# Patient Record
Sex: FEMALE | Race: WHITE | Employment: UNEMPLOYED | URBAN - METROPOLITAN AREA
[De-identification: names, ages, dates, MRNs, and addresses within clinical notes are randomized per-mention and may not be internally consistent; named-entity substitution may affect disease eponyms.]

---

## 2017-12-22 ENCOUNTER — OFFICE VISIT (OUTPATIENT)
Dept: OBGYN CLINIC | Facility: CLINIC | Age: 32
End: 2017-12-22

## 2017-12-22 VITALS
HEART RATE: 72 BPM | BODY MASS INDEX: 22.98 KG/M2 | WEIGHT: 143 LBS | SYSTOLIC BLOOD PRESSURE: 126 MMHG | HEIGHT: 66 IN | RESPIRATION RATE: 16 BRPM | DIASTOLIC BLOOD PRESSURE: 60 MMHG

## 2017-12-22 DIAGNOSIS — Z01.419 ENCOUNTER FOR WELL WOMAN EXAM WITH ROUTINE GYNECOLOGICAL EXAM: Primary | ICD-10-CM

## 2017-12-22 PROCEDURE — 88175 CYTOPATH C/V AUTO FLUID REDO: CPT | Performed by: OBSTETRICS & GYNECOLOGY

## 2017-12-22 PROCEDURE — 99395 PREV VISIT EST AGE 18-39: CPT | Performed by: OBSTETRICS & GYNECOLOGY

## 2017-12-22 PROCEDURE — 87624 HPV HI-RISK TYP POOLED RSLT: CPT | Performed by: OBSTETRICS & GYNECOLOGY

## 2017-12-22 NOTE — PROGRESS NOTES
Deborah Sandifer is a 29year old female [id number] Patient's last menstrual period was 12/05/2017 (exact date). Patient presents with:  Wellness Visit: annual  .Patient has no complaints.  Discussed OCP, nuvaring, Mirena IUID    OBSTETRICS HISTORY:  OB Histor headaches, extremity weakness or numbness. Psychiatric: denies depression or anxiety. Endocrine:   denies excessive thirst or urination. Heme/Lymph:  denies history of anemia, easy bruising or bleeding.       PHYSICAL EXAM:   Constitutional: well develop

## 2019-02-13 ENCOUNTER — OFFICE VISIT (OUTPATIENT)
Dept: OBGYN CLINIC | Facility: CLINIC | Age: 34
End: 2019-02-13
Payer: COMMERCIAL

## 2019-02-13 VITALS
SYSTOLIC BLOOD PRESSURE: 126 MMHG | RESPIRATION RATE: 16 BRPM | HEART RATE: 74 BPM | BODY MASS INDEX: 23.46 KG/M2 | DIASTOLIC BLOOD PRESSURE: 76 MMHG | WEIGHT: 146 LBS | HEIGHT: 66 IN

## 2019-02-13 DIAGNOSIS — Z01.419 ENCOUNTER FOR WELL WOMAN EXAM WITH ROUTINE GYNECOLOGICAL EXAM: Primary | ICD-10-CM

## 2019-02-13 DIAGNOSIS — Z12.4 CERVICAL CANCER SCREENING: ICD-10-CM

## 2019-02-13 PROCEDURE — 88175 CYTOPATH C/V AUTO FLUID REDO: CPT | Performed by: OBSTETRICS & GYNECOLOGY

## 2019-02-13 PROCEDURE — 99395 PREV VISIT EST AGE 18-39: CPT | Performed by: OBSTETRICS & GYNECOLOGY

## 2019-02-13 PROCEDURE — 87624 HPV HI-RISK TYP POOLED RSLT: CPT | Performed by: OBSTETRICS & GYNECOLOGY

## 2019-02-13 RX ORDER — ESCITALOPRAM OXALATE 10 MG/1
10 TABLET ORAL DAILY
Qty: 30 TABLET | Refills: 0 | Status: SHIPPED | OUTPATIENT
Start: 2019-02-13 | End: 2019-02-14

## 2019-02-13 NOTE — PROGRESS NOTES
Rebecca Reed is a 34year old female [id number] Patient's last menstrual period was 02/09/2019 (exact date). Patient presents with:  Wellness Visit  .   Patient is very emotional and crying easily, states she still cannot get over the SAB from couple years Birth control/protection: Condom    Other Topics      Concerns:         Service: Not Asked        Blood Transfusions: Not Asked        Caffeine Concern: No        Occupational Exposure: Not Asked        Hobby Hazards: Not Asked        Sleep Betty bruises  Extremities: no edema, no cyanosis  Psychiatric:  Oriented to time, place, person and situation.  Appropriate mood and affect    Pelvic Exam:  External Genitalia: normal appearance, hair distribution, and no lesions  Urethral Meatus:  normal in siz

## 2019-02-14 LAB — HPV I/H RISK 1 DNA SPEC QL NAA+PROBE: NEGATIVE

## 2019-02-14 RX ORDER — ESCITALOPRAM OXALATE 10 MG/1
10 TABLET ORAL DAILY
Qty: 30 TABLET | Refills: 0 | Status: SHIPPED | OUTPATIENT
Start: 2019-02-14 | End: 2019-03-13

## 2019-02-22 DIAGNOSIS — Z01.419 WELL WOMAN EXAM: Primary | ICD-10-CM

## 2019-02-28 LAB
ABSOLUTE BASOPHILS: 31 CELLS/UL (ref 0–200)
ABSOLUTE EOSINOPHILS: 180 CELLS/UL (ref 15–500)
ABSOLUTE LYMPHOCYTES: 1373 CELLS/UL (ref 850–3900)
ABSOLUTE MONOCYTES: 383 CELLS/UL (ref 200–950)
ABSOLUTE NEUTROPHILS: 2433 CELLS/UL (ref 1500–7800)
ALBUMIN/GLOBULIN RATIO: 1.7 (CALC) (ref 1–2.5)
ALBUMIN: 4.3 G/DL (ref 3.6–5.1)
ALKALINE PHOSPHATASE: 47 U/L (ref 33–115)
ALT: 14 U/L (ref 6–29)
AST: 14 U/L (ref 10–30)
BASOPHILS: 0.7 %
BILIRUBIN, TOTAL: 0.7 MG/DL (ref 0.2–1.2)
BUN: 20 MG/DL (ref 7–25)
CALCIUM: 9.6 MG/DL (ref 8.6–10.2)
CARBON DIOXIDE: 30 MMOL/L (ref 20–32)
CHLORIDE: 104 MMOL/L (ref 98–110)
CHOL/HDLC RATIO: 2.9 (CALC)
CHOLESTEROL, TOTAL: 193 MG/DL
CREATININE: 0.85 MG/DL (ref 0.5–1.1)
EGFR IF AFRICN AM: 106 ML/MIN/1.73M2
EGFR IF NONAFRICN AM: 91 ML/MIN/1.73M2
EOSINOPHILS: 4.1 %
GLOBULIN: 2.6 G/DL (CALC) (ref 1.9–3.7)
GLUCOSE: 84 MG/DL (ref 65–99)
HDL CHOLESTEROL: 66 MG/DL
HEMATOCRIT: 43 % (ref 35–45)
HEMOGLOBIN: 14.5 G/DL (ref 11.7–15.5)
LDL-CHOLESTEROL: 111 MG/DL (CALC)
LYMPHOCYTES: 31.2 %
MCH: 30.5 PG (ref 27–33)
MCHC: 33.7 G/DL (ref 32–36)
MCV: 90.5 FL (ref 80–100)
MONOCYTES: 8.7 %
MPV: 11.1 FL (ref 7.5–12.5)
NEUTROPHILS: 55.3 %
NON-HDL CHOLESTEROL: 127 MG/DL (CALC)
PLATELET COUNT: 181 THOUSAND/UL (ref 140–400)
POTASSIUM: 4.7 MMOL/L (ref 3.5–5.3)
PROTEIN, TOTAL: 6.9 G/DL (ref 6.1–8.1)
RDW: 12.8 % (ref 11–15)
RED BLOOD CELL COUNT: 4.75 MILLION/UL (ref 3.8–5.1)
SODIUM: 139 MMOL/L (ref 135–146)
TRIGLYCERIDES: 72 MG/DL
TSH: 1.73 MIU/L
VITAMIN D, 25-OH, TOTAL: 38 NG/ML (ref 30–100)
WHITE BLOOD CELL COUNT: 4.4 THOUSAND/UL (ref 3.8–10.8)

## 2019-03-12 ENCOUNTER — TELEPHONE (OUTPATIENT)
Dept: OBGYN CLINIC | Facility: CLINIC | Age: 34
End: 2019-03-12

## 2019-03-12 NOTE — TELEPHONE ENCOUNTER
Patient states that she feels like her symptoms have improved on the medication however she is complaining of increased sleepiness and decreased sex drive. She would like to know if she can decrease the dose of the medication.  Will check with Dr. Simon Norris

## 2019-03-12 NOTE — TELEPHONE ENCOUNTER
Per pt she would like to talk to you about the medication she was given a month ago. She thought that she was going to be taking for more than a month but in the system is only for a 1 month.  She also is getting most of the side affects and would like to t

## 2019-03-13 RX ORDER — ESCITALOPRAM OXALATE 5 MG/1
5 TABLET ORAL DAILY
Qty: 30 TABLET | Refills: 11 | Status: SHIPPED | OUTPATIENT
Start: 2019-03-13 | End: 2021-05-17

## 2019-04-10 RX ORDER — ESCITALOPRAM OXALATE 10 MG/1
TABLET ORAL
Qty: 30 TABLET | Refills: 10 | Status: SHIPPED | OUTPATIENT
Start: 2019-04-10 | End: 2019-08-30

## 2019-08-30 ENCOUNTER — OFFICE VISIT (OUTPATIENT)
Dept: OBGYN CLINIC | Facility: CLINIC | Age: 34
End: 2019-08-30
Payer: COMMERCIAL

## 2019-08-30 VITALS
BODY MASS INDEX: 23.14 KG/M2 | SYSTOLIC BLOOD PRESSURE: 124 MMHG | HEART RATE: 92 BPM | HEIGHT: 66 IN | WEIGHT: 144 LBS | DIASTOLIC BLOOD PRESSURE: 58 MMHG

## 2019-08-30 DIAGNOSIS — N76.0 VAGINITIS AND VULVOVAGINITIS: ICD-10-CM

## 2019-08-30 DIAGNOSIS — Z01.419 WELL WOMAN EXAM WITH ROUTINE GYNECOLOGICAL EXAM: Primary | ICD-10-CM

## 2019-08-30 PROCEDURE — 87510 GARDNER VAG DNA DIR PROBE: CPT | Performed by: OBSTETRICS & GYNECOLOGY

## 2019-08-30 PROCEDURE — 87591 N.GONORRHOEAE DNA AMP PROB: CPT | Performed by: OBSTETRICS & GYNECOLOGY

## 2019-08-30 PROCEDURE — 99213 OFFICE O/P EST LOW 20 MIN: CPT | Performed by: OBSTETRICS & GYNECOLOGY

## 2019-08-30 PROCEDURE — 87660 TRICHOMONAS VAGIN DIR PROBE: CPT | Performed by: OBSTETRICS & GYNECOLOGY

## 2019-08-30 PROCEDURE — 87491 CHLMYD TRACH DNA AMP PROBE: CPT | Performed by: OBSTETRICS & GYNECOLOGY

## 2019-08-30 PROCEDURE — 87480 CANDIDA DNA DIR PROBE: CPT | Performed by: OBSTETRICS & GYNECOLOGY

## 2019-08-30 NOTE — PROGRESS NOTES
Her  had a vasectomy refuses to go back and get checked. Complaining of burning at the on his penis. Refuses to go to the doctor. Wishes to have cultures.   She does have a thin white discharge affirm was done cervix without lesion Gen-Probe done

## 2019-09-01 LAB
C TRACH DNA SPEC QL NAA+PROBE: NEGATIVE
N GONORRHOEA DNA SPEC QL NAA+PROBE: NEGATIVE

## 2019-09-05 ENCOUNTER — TELEPHONE (OUTPATIENT)
Dept: OBGYN CLINIC | Facility: CLINIC | Age: 34
End: 2019-09-05

## 2019-09-05 RX ORDER — METRONIDAZOLE 500 MG/1
500 TABLET ORAL 2 TIMES DAILY
Qty: 14 TABLET | Refills: 0 | Status: SHIPPED | OUTPATIENT
Start: 2019-09-05 | End: 2019-09-12

## 2019-09-05 NOTE — PROGRESS NOTES
Patient aware of results and recommendations and would like a script for Flagyl. Script placed. . Patient verbalizes understanding.

## 2021-05-17 ENCOUNTER — OFFICE VISIT (OUTPATIENT)
Dept: OBGYN CLINIC | Facility: CLINIC | Age: 36
End: 2021-05-17
Payer: COMMERCIAL

## 2021-05-17 VITALS
DIASTOLIC BLOOD PRESSURE: 70 MMHG | BODY MASS INDEX: 22.98 KG/M2 | HEIGHT: 66 IN | SYSTOLIC BLOOD PRESSURE: 110 MMHG | WEIGHT: 143 LBS | HEART RATE: 80 BPM

## 2021-05-17 DIAGNOSIS — Z12.4 CERVICAL CANCER SCREENING: ICD-10-CM

## 2021-05-17 DIAGNOSIS — Z01.419 ENCOUNTER FOR WELL WOMAN EXAM WITH ROUTINE GYNECOLOGICAL EXAM: Primary | ICD-10-CM

## 2021-05-17 PROCEDURE — 3008F BODY MASS INDEX DOCD: CPT | Performed by: OBSTETRICS & GYNECOLOGY

## 2021-05-17 PROCEDURE — 3074F SYST BP LT 130 MM HG: CPT | Performed by: OBSTETRICS & GYNECOLOGY

## 2021-05-17 PROCEDURE — 99395 PREV VISIT EST AGE 18-39: CPT | Performed by: OBSTETRICS & GYNECOLOGY

## 2021-05-17 PROCEDURE — 88175 CYTOPATH C/V AUTO FLUID REDO: CPT | Performed by: OBSTETRICS & GYNECOLOGY

## 2021-05-17 PROCEDURE — 87624 HPV HI-RISK TYP POOLED RSLT: CPT | Performed by: OBSTETRICS & GYNECOLOGY

## 2021-05-17 PROCEDURE — 3078F DIAST BP <80 MM HG: CPT | Performed by: OBSTETRICS & GYNECOLOGY

## 2021-05-17 NOTE — PROGRESS NOTES
Mike Peña is a 37year old female [id number] Patient's last menstrual period was 05/06/2021 (exact date). Patient presents with:  Wellness Visit  .   Patient has no complaints, would like to have blood work done  OBSTETRICS HISTORY:  OB History   Gravid Helmet: Not Asked        Seat Belt: Yes        Self-Exams: Not Asked    Social History Narrative      Not on file    Social Determinants of Health  Financial Resource Strain:       Difficulty of Paying Living Expenses:   Food Insecurity:       Worried Camelia Bruce itching  Musculoskeletal:  denies back pain. Skin/Breast:  Denies any breast pain, lumps, or discharge. Neurological:  denies headaches, extremity weakness or numbness. Psychiatric: denies depression or anxiety.   Endocrine:   denies excessive thirst or

## 2021-05-22 ENCOUNTER — LAB ENCOUNTER (OUTPATIENT)
Dept: LAB | Age: 36
End: 2021-05-22
Attending: FAMILY MEDICINE
Payer: COMMERCIAL

## 2021-05-22 DIAGNOSIS — Z01.419 ENCOUNTER FOR WELL WOMAN EXAM WITH ROUTINE GYNECOLOGICAL EXAM: ICD-10-CM

## 2021-05-22 PROCEDURE — 84443 ASSAY THYROID STIM HORMONE: CPT

## 2021-05-22 PROCEDURE — 80053 COMPREHEN METABOLIC PANEL: CPT

## 2021-05-22 PROCEDURE — 80061 LIPID PANEL: CPT

## 2021-05-22 PROCEDURE — 36415 COLL VENOUS BLD VENIPUNCTURE: CPT

## 2021-05-22 PROCEDURE — 85025 COMPLETE CBC W/AUTO DIFF WBC: CPT

## 2022-06-10 ENCOUNTER — HOSPITAL ENCOUNTER (OUTPATIENT)
Age: 37
Discharge: HOME | End: 2022-06-10
Attending: EMERGENCY MEDICINE
Payer: COMMERCIAL

## 2022-06-10 VITALS
RESPIRATION RATE: 16 BRPM | DIASTOLIC BLOOD PRESSURE: 82 MMHG | SYSTOLIC BLOOD PRESSURE: 128 MMHG | HEIGHT: 66 IN | BODY MASS INDEX: 22.5 KG/M2 | TEMPERATURE: 97 F | OXYGEN SATURATION: 98 % | WEIGHT: 140 LBS | HEART RATE: 68 BPM

## 2022-06-10 DIAGNOSIS — L03.011 CELLULITIS OF FINGER OF RIGHT HAND: Primary | ICD-10-CM

## 2022-06-10 PROCEDURE — 99213 OFFICE O/P EST LOW 20 MIN: CPT

## 2022-06-10 PROCEDURE — 99203 OFFICE O/P NEW LOW 30 MIN: CPT

## 2022-06-10 RX ORDER — AMOXICILLIN AND CLAVULANATE POTASSIUM 875; 125 MG/1; MG/1
1 TABLET, FILM COATED ORAL 2 TIMES DAILY
Qty: 20 TABLET | Refills: 0 | Status: SHIPPED | OUTPATIENT
Start: 2022-06-10 | End: 2022-06-20

## 2022-06-10 RX ORDER — AMOXICILLIN AND CLAVULANATE POTASSIUM 875; 125 MG/1; MG/1
1 TABLET, FILM COATED ORAL 2 TIMES DAILY
Qty: 20 TABLET | Refills: 0 | Status: SHIPPED | OUTPATIENT
Start: 2022-06-10 | End: 2022-06-10

## 2022-06-15 NOTE — ED INITIAL ASSESSMENT (HPI)
Pt with splinter under R 5th fingernail since Wed; swelling/redness also since wed; no fever    Seen at outside IC x 2 and unable to remove; pt was put on bactrim today Quality 431: Preventive Care And Screening: Unhealthy Alcohol Use - Screening: Patient not identified as an unhealthy alcohol user when screened for unhealthy alcohol use using a systematic screening method Quality 110: Preventive Care And Screening: Influenza Immunization: Influenza Immunization previously received during influenza season Detail Level: Detailed Quality 226: Preventive Care And Screening: Tobacco Use: Screening And Cessation Intervention: Patient screened for tobacco use and is an ex/non-smoker Quality 130: Documentation Of Current Medications In The Medical Record: Current Medications Documented

## 2023-10-02 ENCOUNTER — OFFICE VISIT (OUTPATIENT)
Dept: FAMILY MEDICINE CLINIC | Facility: CLINIC | Age: 38
End: 2023-10-02
Payer: COMMERCIAL

## 2023-10-02 VITALS
SYSTOLIC BLOOD PRESSURE: 100 MMHG | DIASTOLIC BLOOD PRESSURE: 60 MMHG | TEMPERATURE: 98 F | WEIGHT: 148 LBS | BODY MASS INDEX: 23.78 KG/M2 | RESPIRATION RATE: 16 BRPM | HEART RATE: 66 BPM | HEIGHT: 66.14 IN

## 2023-10-02 DIAGNOSIS — L81.9 HYPERPIGMENTATION OF SKIN: ICD-10-CM

## 2023-10-02 DIAGNOSIS — R20.2 NUMBNESS AND TINGLING OF LEFT UPPER AND LOWER EXTREMITY: ICD-10-CM

## 2023-10-02 DIAGNOSIS — L60.8 TOENAIL DEFORMITY: ICD-10-CM

## 2023-10-02 DIAGNOSIS — R20.0 NUMBNESS AND TINGLING OF LEFT UPPER AND LOWER EXTREMITY: ICD-10-CM

## 2023-10-02 DIAGNOSIS — R43.8 METALLIC TASTE: Primary | ICD-10-CM

## 2023-10-02 DIAGNOSIS — E78.2 MIXED HYPERLIPIDEMIA: ICD-10-CM

## 2023-10-02 DIAGNOSIS — Z00.00 LABORATORY EXAM ORDERED AS PART OF ROUTINE GENERAL MEDICAL EXAMINATION: ICD-10-CM

## 2023-10-02 PROCEDURE — 3078F DIAST BP <80 MM HG: CPT | Performed by: STUDENT IN AN ORGANIZED HEALTH CARE EDUCATION/TRAINING PROGRAM

## 2023-10-02 PROCEDURE — 99204 OFFICE O/P NEW MOD 45 MIN: CPT | Performed by: STUDENT IN AN ORGANIZED HEALTH CARE EDUCATION/TRAINING PROGRAM

## 2023-10-02 PROCEDURE — 3008F BODY MASS INDEX DOCD: CPT | Performed by: STUDENT IN AN ORGANIZED HEALTH CARE EDUCATION/TRAINING PROGRAM

## 2023-10-02 PROCEDURE — 3074F SYST BP LT 130 MM HG: CPT | Performed by: STUDENT IN AN ORGANIZED HEALTH CARE EDUCATION/TRAINING PROGRAM

## 2023-10-04 ENCOUNTER — LAB ENCOUNTER (OUTPATIENT)
Dept: LAB | Age: 38
End: 2023-10-04
Attending: STUDENT IN AN ORGANIZED HEALTH CARE EDUCATION/TRAINING PROGRAM
Payer: COMMERCIAL

## 2023-10-04 DIAGNOSIS — E78.2 MIXED HYPERLIPIDEMIA: ICD-10-CM

## 2023-10-04 DIAGNOSIS — Z00.00 LABORATORY EXAM ORDERED AS PART OF ROUTINE GENERAL MEDICAL EXAMINATION: ICD-10-CM

## 2023-10-04 DIAGNOSIS — R43.8 METALLIC TASTE: ICD-10-CM

## 2023-10-04 LAB
ALBUMIN SERPL-MCNC: 3.8 G/DL (ref 3.4–5)
ALBUMIN/GLOB SERPL: 1.2 {RATIO} (ref 1–2)
ALP LIVER SERPL-CCNC: 53 U/L
ALT SERPL-CCNC: 20 U/L
ANION GAP SERPL CALC-SCNC: 3 MMOL/L (ref 0–18)
AST SERPL-CCNC: 12 U/L (ref 15–37)
BASOPHILS # BLD AUTO: 0.02 X10(3) UL (ref 0–0.2)
BASOPHILS NFR BLD AUTO: 0.4 %
BILIRUB SERPL-MCNC: 0.6 MG/DL (ref 0.1–2)
BILIRUB UR QL STRIP.AUTO: NEGATIVE
BUN BLD-MCNC: 20 MG/DL (ref 7–18)
CALCIUM BLD-MCNC: 8.9 MG/DL (ref 8.5–10.1)
CHLORIDE SERPL-SCNC: 106 MMOL/L (ref 98–112)
CHOLEST SERPL-MCNC: 191 MG/DL (ref ?–200)
CLARITY UR REFRACT.AUTO: CLEAR
CO2 SERPL-SCNC: 27 MMOL/L (ref 21–32)
CREAT BLD-MCNC: 0.73 MG/DL
EGFRCR SERPLBLD CKD-EPI 2021: 110 ML/MIN/1.73M2 (ref 60–?)
EOSINOPHIL # BLD AUTO: 0.18 X10(3) UL (ref 0–0.7)
EOSINOPHIL NFR BLD AUTO: 3.6 %
ERYTHROCYTE [DISTWIDTH] IN BLOOD BY AUTOMATED COUNT: 12.9 %
FASTING PATIENT LIPID ANSWER: YES
FASTING STATUS PATIENT QL REPORTED: YES
FOLATE SERPL-MCNC: 14.2 NG/ML (ref 8.7–?)
GLOBULIN PLAS-MCNC: 3.2 G/DL (ref 2.8–4.4)
GLUCOSE BLD-MCNC: 79 MG/DL (ref 70–99)
GLUCOSE UR STRIP.AUTO-MCNC: NORMAL MG/DL
HCT VFR BLD AUTO: 43.7 %
HDLC SERPL-MCNC: 60 MG/DL (ref 40–59)
HGB BLD-MCNC: 14.1 G/DL
IMM GRANULOCYTES # BLD AUTO: 0.01 X10(3) UL (ref 0–1)
IMM GRANULOCYTES NFR BLD: 0.2 %
KETONES UR STRIP.AUTO-MCNC: NEGATIVE MG/DL
LDLC SERPL CALC-MCNC: 123 MG/DL (ref ?–100)
LEUKOCYTE ESTERASE UR QL STRIP.AUTO: NEGATIVE
LYMPHOCYTES # BLD AUTO: 1.09 X10(3) UL (ref 1–4)
LYMPHOCYTES NFR BLD AUTO: 21.5 %
MCH RBC QN AUTO: 29.2 PG (ref 26–34)
MCHC RBC AUTO-ENTMCNC: 32.3 G/DL (ref 31–37)
MCV RBC AUTO: 90.5 FL
MONOCYTES # BLD AUTO: 0.39 X10(3) UL (ref 0.1–1)
MONOCYTES NFR BLD AUTO: 7.7 %
NEUTROPHILS # BLD AUTO: 3.37 X10 (3) UL (ref 1.5–7.7)
NEUTROPHILS # BLD AUTO: 3.37 X10(3) UL (ref 1.5–7.7)
NEUTROPHILS NFR BLD AUTO: 66.6 %
NITRITE UR QL STRIP.AUTO: NEGATIVE
NONHDLC SERPL-MCNC: 131 MG/DL (ref ?–130)
OSMOLALITY SERPL CALC.SUM OF ELEC: 284 MOSM/KG (ref 275–295)
PH UR STRIP.AUTO: 7 [PH] (ref 5–8)
PLATELET # BLD AUTO: 164 10(3)UL (ref 150–450)
POTASSIUM SERPL-SCNC: 4.4 MMOL/L (ref 3.5–5.1)
PROT SERPL-MCNC: 7 G/DL (ref 6.4–8.2)
PROT UR STRIP.AUTO-MCNC: NEGATIVE MG/DL
RBC # BLD AUTO: 4.83 X10(6)UL
RBC #/AREA URNS AUTO: >10 /HPF
SODIUM SERPL-SCNC: 136 MMOL/L (ref 136–145)
SP GR UR STRIP.AUTO: 1.02 (ref 1–1.03)
T4 FREE SERPL-MCNC: 1 NG/DL (ref 0.8–1.7)
TRIGL SERPL-MCNC: 42 MG/DL (ref 30–149)
TSI SER-ACNC: 1.14 MIU/ML (ref 0.36–3.74)
UROBILINOGEN UR STRIP.AUTO-MCNC: NORMAL MG/DL
VIT B12 SERPL-MCNC: 667 PG/ML (ref 193–986)
VLDLC SERPL CALC-MCNC: 7 MG/DL (ref 0–30)
WBC # BLD AUTO: 5.1 X10(3) UL (ref 4–11)

## 2023-10-04 PROCEDURE — 85025 COMPLETE CBC W/AUTO DIFF WBC: CPT

## 2023-10-04 PROCEDURE — 84443 ASSAY THYROID STIM HORMONE: CPT

## 2023-10-04 PROCEDURE — 80061 LIPID PANEL: CPT

## 2023-10-04 PROCEDURE — 82746 ASSAY OF FOLIC ACID SERUM: CPT

## 2023-10-04 PROCEDURE — 80053 COMPREHEN METABOLIC PANEL: CPT

## 2023-10-04 PROCEDURE — 82607 VITAMIN B-12: CPT

## 2023-10-04 PROCEDURE — 84439 ASSAY OF FREE THYROXINE: CPT

## 2023-10-04 PROCEDURE — 81001 URINALYSIS AUTO W/SCOPE: CPT

## 2023-10-06 ENCOUNTER — OFFICE VISIT (OUTPATIENT)
Dept: OTOLARYNGOLOGY | Facility: CLINIC | Age: 38
End: 2023-10-06

## 2023-10-06 DIAGNOSIS — J34.89 NASAL OBSTRUCTION: ICD-10-CM

## 2023-10-06 DIAGNOSIS — R09.81 NASAL CONGESTION: ICD-10-CM

## 2023-10-06 DIAGNOSIS — J34.3 HYPERTROPHY OF BOTH INFERIOR NASAL TURBINATES: ICD-10-CM

## 2023-10-06 DIAGNOSIS — J34.2 NASAL SEPTAL DEVIATION: Primary | ICD-10-CM

## 2023-10-06 RX ORDER — AZELASTINE 1 MG/ML
2 SPRAY, METERED NASAL 2 TIMES DAILY
Qty: 30 ML | Refills: 0 | Status: SHIPPED | OUTPATIENT
Start: 2023-10-06

## 2023-10-06 RX ORDER — LEVOCETIRIZINE DIHYDROCHLORIDE 5 MG/1
5 TABLET, FILM COATED ORAL EVERY EVENING
Qty: 21 TABLET | Refills: 0 | Status: SHIPPED | OUTPATIENT
Start: 2023-10-06 | End: 2023-10-27

## 2023-10-06 NOTE — PROGRESS NOTES
Uriel Mack is a 30year old female. Patient presents with:  Nose Problem: Pt c/o left nostril congested. X 2 years. ASSESSMENT AND PLAN:   1. Nasal septal deviation      2. Nasal obstruction  46-year-old presents with chronic nasal obstruction. Feels that the left side of her nose is chronically blocked. It has been like this most of her life. Affects her sleep and her snoring and quality of life. She has tried a medication nasal spray from [employer] she is unsure of the name. It did not help all too much. She denies significant allergies. She denies any sinus infections. She also reports chronic occipital headaches. Exam she has 2 septal spurs that are prominent on the left one is higher up of the mid septum and the other is almost impinging into the middle meatus. On the right it also appears that she has a spur of the mid septum near the head of the middle turbinate. Her turbinates are hypertrophied on both sides. Discussed that she does have exam findings that could be leading to her nasal obstruction. Predominantly the septal spurs on the left side. Somewhat posteriorly and superiorly. We will get a CT scan to better characterize given the somewhat difficult location of the spurs. Also trial on Flonase topical nasal spray and oral antihistamine prior to sleep. We will bring her back after the scan and to review response to the medications. Discussed septoplasty as acute reduction in detail with her. She is going to consider the surgery and may further arrange this if she does not respond to medications. MDM  -2+ chronic issues  -Decision regarding a procedure    - CT SINUS [facility] (CPT=70486); Future    3. Hypertrophy of both inferior nasal turbinates      4. Nasal congestion        The patient indicates understanding of these issues and agrees to the plan.       EXAM:   LMP 09/07/2023 (Exact Date)     Pertinent exam findings may also be noted above in assessment and plan System Details   Skin Inspection - Normal.   Constitutional Overall appearance - Normal.   Head/Face Symmetric, TMJ tenderness not present    Eyes EOMI, PERRL   Right ear:  Canal clear, TM intact, no COLLEEN   Left ear:  Canal clear, TM intact, no COLLEEN   Nose: Exam she has 2 septal spurs that are prominent on the left one is higher up of the mid septum and the other is almost impinging into the middle meatus. On the right it also appears that she has a spur of the mid septum near the head of the middle turbinate. Her turbinates are hypertrophied on both sides. , nasal valves without collapse    Oral cavity/Oropharynx: No lesions or masses on inspection or palpation, tonsils symmetric    Neck: Soft without LAD, thyroid not enlarged  Voice clear/ no stridor   Other:      Scopes and Procedures:     Nasal Endoscopy Procedure Note     Due to inability for adequate examination of the nose and nasopharynx and need for magnification to perform the examination, endoscopy was performed. Risks and benefits were discussed with patient/family and they have given verbal consent to proceed. Pre-operative Diagnosis:   Nasal septal deviation  (primary encounter diagnosis)  Nasal obstruction  Hypertrophy of both inferior nasal turbinates  Nasal congestion    Post-operative Diagnosis: Same    Procedure: Diagnostic nasal endoscopy    Anesthesia: Topical anesthetic Seymour     Surgeon Marilin Leon MD    EBL: 0cc    Procedure Detail & Findings:     After placement of topical anesthetic intranasally the endoscope was inserted into each nares and driven through the nasal cavity into the nasopharynx. The following findings were noted:    Exam she has 2 septal spurs that are prominent on the left one is higher up of the mid septum and the other is almost impinging into the middle meatus. On the right it also appears that she has a spur of the mid septum near the head of the middle turbinate.   Her turbinates are hypertrophied on both sides.  Middle meatus: Patent  Middle turbinates: Normal  Purulence: None noted  Polyps: None noted  Nasopharynx and eustachian tube: No masses  Other: The middle and superior meatus, the turbinates, and the spheno-ethmoid recess were inspected and seen to be without significant abnormal findings. Condition: Stable    Complications: Patient tolerated the procedure well with no immediate complication. Geronimo Greenberg MD        Current Outpatient Medications   Medication Sig Dispense Refill    azelastine 0.1 % Nasal Solution 2 sprays by Nasal route 2 (two) times daily. 30 mL 0    levocetirizine 5 MG Oral Tab Take 1 tablet (5 mg total) by mouth every evening for 21 days.  21 tablet 0      Past Medical History:   Diagnosis Date    Pap smear for cervical cancer screening 10/14,04/12    negative      Social History:  Social History     Socioeconomic History    Marital status:    Tobacco Use    Smoking status: Never    Smokeless tobacco: Never    Tobacco comments:     Socially   Vaping Use    Vaping Use: Never used   Substance and Sexual Activity    Alcohol use: Yes     Comment: occ    Drug use: No    Sexual activity: Yes     Birth control/protection: Condom   Other Topics Concern    Caffeine Concern Yes     Comment: 1 x/week    Exercise Yes     Comment: 20-30 min/daily    Seat Belt Yes          Reynaldo Calderon MD  10/6/2023  3:35 PM

## 2023-10-11 ENCOUNTER — LAB ENCOUNTER (OUTPATIENT)
Dept: LAB | Age: 38
End: 2023-10-11
Attending: STUDENT IN AN ORGANIZED HEALTH CARE EDUCATION/TRAINING PROGRAM
Payer: COMMERCIAL

## 2023-10-11 DIAGNOSIS — R43.8 METALLIC TASTE: ICD-10-CM

## 2023-10-11 PROCEDURE — 87338 HPYLORI STOOL AG IA: CPT

## 2023-10-13 ENCOUNTER — HOSPITAL ENCOUNTER (OUTPATIENT)
Dept: GENERAL RADIOLOGY | Age: 38
Discharge: HOME | End: 2023-10-13
Attending: STUDENT IN AN ORGANIZED HEALTH CARE EDUCATION/TRAINING PROGRAM
Payer: COMMERCIAL

## 2023-10-13 DIAGNOSIS — R20.0 NUMBNESS AND TINGLING OF LEFT UPPER AND LOWER EXTREMITY: ICD-10-CM

## 2023-10-13 DIAGNOSIS — R20.2 NUMBNESS AND TINGLING OF LEFT UPPER AND LOWER EXTREMITY: ICD-10-CM

## 2023-10-13 DIAGNOSIS — M41.9 MILD SCOLIOSIS: ICD-10-CM

## 2023-10-13 DIAGNOSIS — R20.0 NUMBNESS AND TINGLING OF LEFT UPPER AND LOWER EXTREMITY: Primary | ICD-10-CM

## 2023-10-13 DIAGNOSIS — R20.2 NUMBNESS AND TINGLING OF LEFT UPPER AND LOWER EXTREMITY: Primary | ICD-10-CM

## 2023-10-13 PROCEDURE — 72110 X-RAY EXAM L-2 SPINE 4/>VWS: CPT | Performed by: STUDENT IN AN ORGANIZED HEALTH CARE EDUCATION/TRAINING PROGRAM

## 2023-10-13 PROCEDURE — 72050 X-RAY EXAM NECK SPINE 4/5VWS: CPT | Performed by: STUDENT IN AN ORGANIZED HEALTH CARE EDUCATION/TRAINING PROGRAM

## 2023-10-15 DIAGNOSIS — R20.0 NUMBNESS AND TINGLING OF LEFT UPPER AND LOWER EXTREMITY: Primary | ICD-10-CM

## 2023-10-15 DIAGNOSIS — R20.2 NUMBNESS AND TINGLING OF LEFT UPPER AND LOWER EXTREMITY: Primary | ICD-10-CM

## 2023-10-16 DIAGNOSIS — R43.8 METALLIC TASTE: Primary | ICD-10-CM

## 2023-10-16 LAB — H PYLORI AG STL QL IA: NEGATIVE

## 2023-10-16 RX ORDER — OMEPRAZOLE 40 MG/1
40 CAPSULE, DELAYED RELEASE ORAL DAILY
Qty: 90 CAPSULE | Refills: 0 | Status: SHIPPED | OUTPATIENT
Start: 2023-10-16

## 2023-10-30 ENCOUNTER — OFFICE VISIT (OUTPATIENT)
Dept: OTOLARYNGOLOGY | Facility: CLINIC | Age: 38
End: 2023-10-30

## 2023-10-30 DIAGNOSIS — J30.9 CHRONIC ALLERGIC RHINITIS: Primary | ICD-10-CM

## 2023-10-30 DIAGNOSIS — R09.81 NASAL CONGESTION: ICD-10-CM

## 2023-10-30 PROCEDURE — 99213 OFFICE O/P EST LOW 20 MIN: CPT | Performed by: STUDENT IN AN ORGANIZED HEALTH CARE EDUCATION/TRAINING PROGRAM

## 2023-10-30 RX ORDER — AZELASTINE 1 MG/ML
2 SPRAY, METERED NASAL 2 TIMES DAILY
Qty: 30 ML | Refills: 5 | Status: SHIPPED | OUTPATIENT
Start: 2023-10-30

## 2023-10-31 NOTE — PROGRESS NOTES
Eda Ray is a 41year old female. Patient presents with: Follow - Up: Patient here for f/up for nasal issues, deviated septum. Patient reports she is feeling better. ASSESSMENT AND PLAN:   1. Chronic allergic rhinitis  62-year-old in follow-up regarding her chronic nasal obstruction. On exam she had a septal deviation and she was started on azelastine and oral antihistamine prior to sleep. She states that she is doing excellent and would like refills of the Astelin. We discussed in detail the use of the nasal spray. She has not yet ready to pursue a procedure. She may consider this in the future. She can return if any future issues arise or she may want a more permanent correction of her posterior septal deviation. 2. Nasal congestion        The patient indicates understanding of these issues and agrees to the plan. EXAM:   LMP 09/07/2023 (Exact Date)     Pertinent exam findings may also be noted above in assessment and plan     System Details   Skin Inspection - Normal.   Constitutional Overall appearance - Normal.   Head/Face Symmetric, TMJ tenderness not present    Eyes EOMI, PERRL   Right ear:  Canal clear, TM intact, no COLLEEN   Left ear:  Canal clear, TM intact, no COLLEEN   Nose: Septum midline, inferior turbinates not enlarged, nasal valves without collapse    Oral cavity/Oropharynx: No lesions or masses on inspection or palpation, tonsils symmetric    Neck: Soft without LAD, thyroid not enlarged  Voice clear/ no stridor   Other:      Scopes and Procedures:             Current Outpatient Medications   Medication Sig Dispense Refill    azelastine 0.1 % Nasal Solution 2 sprays by Nasal route 2 (two) times daily. 30 mL 5    Omeprazole 40 MG Oral Capsule Delayed Release Take 1 capsule (40 mg total) by mouth daily.  90 capsule 0      Past Medical History:   Diagnosis Date    Pap smear for cervical cancer screening 10/14,04/12    negative      Social History:  Social History     Socioeconomic History    Marital status:    Tobacco Use    Smoking status: Never    Smokeless tobacco: Never    Tobacco comments:     Socially   Vaping Use    Vaping Use: Never used   Substance and Sexual Activity    Alcohol use: Yes     Comment: occ    Drug use: No    Sexual activity: Yes     Birth control/protection: Condom   Other Topics Concern    Caffeine Concern Yes     Comment: 1 x/week    Exercise Yes     Comment: 20-30 min/daily    Seat Belt Yes          Bruna Lloyd MD  10/31/2023  8:09 AM

## 2023-11-03 ENCOUNTER — HOSPITAL ENCOUNTER (OUTPATIENT)
Dept: GENERAL RADIOLOGY | Age: 38
Discharge: HOME | End: 2023-11-03
Attending: STUDENT IN AN ORGANIZED HEALTH CARE EDUCATION/TRAINING PROGRAM
Payer: COMMERCIAL

## 2023-11-03 DIAGNOSIS — R20.2 NUMBNESS AND TINGLING OF LEFT UPPER AND LOWER EXTREMITY: ICD-10-CM

## 2023-11-03 DIAGNOSIS — M25.562 CHRONIC PAIN OF LEFT KNEE: ICD-10-CM

## 2023-11-03 DIAGNOSIS — R20.0 NUMBNESS AND TINGLING OF LEFT UPPER AND LOWER EXTREMITY: ICD-10-CM

## 2023-11-03 DIAGNOSIS — Z01.89 ENCOUNTER FOR LOWER EXTREMITY COMPARISON IMAGING STUDY: ICD-10-CM

## 2023-11-03 DIAGNOSIS — G89.29 CHRONIC PAIN OF LEFT KNEE: ICD-10-CM

## 2023-11-03 DIAGNOSIS — M41.9 MILD SCOLIOSIS: ICD-10-CM

## 2023-11-03 PROCEDURE — 72082 X-RAY EXAM ENTIRE SPI 2/3 VW: CPT | Performed by: STUDENT IN AN ORGANIZED HEALTH CARE EDUCATION/TRAINING PROGRAM

## 2023-11-03 PROCEDURE — 73562 X-RAY EXAM OF KNEE 3: CPT | Performed by: STUDENT IN AN ORGANIZED HEALTH CARE EDUCATION/TRAINING PROGRAM

## 2023-11-03 PROCEDURE — 73564 X-RAY EXAM KNEE 4 OR MORE: CPT | Performed by: STUDENT IN AN ORGANIZED HEALTH CARE EDUCATION/TRAINING PROGRAM

## 2023-11-04 DIAGNOSIS — M25.562 CHRONIC PAIN OF LEFT KNEE: Primary | ICD-10-CM

## 2023-11-04 DIAGNOSIS — R20.2 NUMBNESS AND TINGLING OF LEFT UPPER AND LOWER EXTREMITY: Primary | ICD-10-CM

## 2023-11-04 DIAGNOSIS — R20.0 NUMBNESS AND TINGLING OF LEFT UPPER AND LOWER EXTREMITY: Primary | ICD-10-CM

## 2023-11-04 DIAGNOSIS — G89.29 CHRONIC PAIN OF LEFT KNEE: Primary | ICD-10-CM

## 2023-11-04 DIAGNOSIS — M41.9 SCOLIOSIS OF THORACOLUMBAR SPINE, UNSPECIFIED SCOLIOSIS TYPE: ICD-10-CM

## 2023-11-04 DIAGNOSIS — M41.9 MILD SCOLIOSIS: ICD-10-CM

## 2023-11-12 ENCOUNTER — PATIENT MESSAGE (OUTPATIENT)
Dept: FAMILY MEDICINE CLINIC | Facility: CLINIC | Age: 38
End: 2023-11-12

## 2023-11-13 NOTE — TELEPHONE ENCOUNTER
From: Zelalem Donis  To: Jonh Dc  Sent: 11/12/2023 9:04 AM CST  Subject: Sciatica    Morning Dr. Bev [place]    I started having shooting pain in my right hip & leg. Feel like my sciatica is coming back. Should I come & see you or start doing physical therapy? If yes, where do I schedule that appt?      Sincerely,    Rut Mead

## 2023-11-14 NOTE — TELEPHONE ENCOUNTER
I recommend starting physical therapy, if worsening then we should re-evaluate. If any weakness, loss of bowel or bladder control to call or go to the ER. Thank you.

## 2023-12-04 ENCOUNTER — OFFICE VISIT (OUTPATIENT)
Dept: ORTHOPEDICS CLINIC | Facility: CLINIC | Age: 38
End: 2023-12-04
Payer: COMMERCIAL

## 2023-12-04 VITALS — WEIGHT: 147 LBS | BODY MASS INDEX: 23.63 KG/M2 | HEIGHT: 66 IN

## 2023-12-04 DIAGNOSIS — M25.652 HIP STIFFNESS, LEFT: ICD-10-CM

## 2023-12-04 DIAGNOSIS — R29.898 POPPING OF LEFT KNEE JOINT: ICD-10-CM

## 2023-12-04 DIAGNOSIS — M54.16 LUMBAR RADICULOPATHY: Primary | ICD-10-CM

## 2023-12-04 PROCEDURE — 3008F BODY MASS INDEX DOCD: CPT | Performed by: ORTHOPAEDIC SURGERY

## 2023-12-04 PROCEDURE — 99204 OFFICE O/P NEW MOD 45 MIN: CPT | Performed by: ORTHOPAEDIC SURGERY

## 2024-01-08 ENCOUNTER — TELEPHONE (OUTPATIENT)
Dept: PHYSICAL THERAPY | Facility: HOSPITAL | Age: 39
End: 2024-01-08

## 2024-01-09 ENCOUNTER — OFFICE VISIT (OUTPATIENT)
Dept: PHYSICAL THERAPY | Age: 39
End: 2024-01-09
Attending: ORTHOPAEDIC SURGERY
Payer: COMMERCIAL

## 2024-01-09 DIAGNOSIS — M25.652 HIP STIFFNESS, LEFT: ICD-10-CM

## 2024-01-09 DIAGNOSIS — R29.898 POPPING OF LEFT KNEE JOINT: ICD-10-CM

## 2024-01-09 DIAGNOSIS — M54.16 LUMBAR RADICULOPATHY: Primary | ICD-10-CM

## 2024-01-09 PROCEDURE — 97161 PT EVAL LOW COMPLEX 20 MIN: CPT | Performed by: PHYSICAL THERAPIST

## 2024-01-09 PROCEDURE — 97110 THERAPEUTIC EXERCISES: CPT | Performed by: PHYSICAL THERAPIST

## 2024-01-09 NOTE — PROGRESS NOTES
SPINE EVALUATION:     Diagnosis:   Lumbar radiculopathy (M54.16)    Popping of left knee joint (R29.898)    Hip stiffness, left (M25.652)       (L) Thoracic outlet   Referring Provider: Louie  Date of Evaluation:    1/9/2024    Precautions:  None Next MD visit:   none scheduled  Date of Surgery: n/a     PATIENT SUMMARY   Leydi Lorenz is a 38 year old female who presents to therapy today with complaints of lumbar pain with tingling to the (L) LE and also (L) UE tingling. Her tingling and pain typically occurs with sleeping and in supine > in side lying. She dates her initial low back problem back to when she had kids but it was not tingling to the LE's. Pt states she used to lift heavier weights about 3 years ago but did not have back problem at the time. Her sciatica started about one year ago since she stopped working out. She feels the low back pain and leg pain in resting positions after a long day. No trouble with activities during the day. Her tingling refers to the (L) shin. Her knees hurts with kneeling and also pops in deep squatting positions.     Pt describes pain level current 2/10, at best 0/10, at worst 5/10.   Current functional limitations include laying and sleeping positions, kneeling, and deep squatting.     Leydi describes prior level of function ability to work out at the gym consistently and lifting \"heavier weight\". Pt goals include have no LBP and tingling when laying.    Past medical history was reviewed with Leydi. Significant findings include  has a past medical history of Pap smear for cervical cancer screening (10/14,04/12).        Pt denies diplopia, dysarthria, dysphasia, dizziness, drop attacks, bowel/bladder changes, saddle anesthesia, and IRIS LE N/T.    Spine X-Ray on 11/3/23 \" CONCLUSION:    Mild scoliosis about 3.5 mm concave left spanning the thoracolumbar junction.  Scoliosis concave to the right in the lumbar spine about 2ø.  No spinal segmentation anomaly.  No signs  of subluxation, fracture or destructive process.\"        ASSESSMENT  Leydi presents to physical therapy evaluation with primary c/o lumbar pain with tingling to the (L) LE and tingling to the (L) UE when laying down. The results of the objective tests and measures show impairments in core stability, soft tissue restrictions, LE strength, UE strength, asymmetrical pelvic alignment, neural tightness, posture and body mechanics.  Functional deficits include but are not limited to laying and sleeping positions, kneeling, and deep squatting.  Signs and symptoms are consistent with diagnosis of (L) lumbar radiculopathy and (L) thoracic outlet syndrome. Pt and PT discussed evaluation findings, pathology, POC and HEP.  Pt voiced understanding and performs HEP correctly without reported pain. Skilled Physical Therapy is medically necessary to address the above impairments and reach functional goals.     OBJECTIVE:   Observation/Posture: increased forward head, increased kyphotic posture in sitting, anterior pelvic tilt on (L)  Neuro Screen: WNL    Lumbar  AROM: (* denotes performed with pain)  Flexion: 100%  Extension: 100%* (L) PSIS pain  Sidebending: R 100%; L 80%*  Rotation: R 100%; L 100%    Cervical AROM: Mildly restricted BL side bending    Accessory motion: Lumbar p/a' unrestricted and no pain.   Palpation: TTP at (L) PSIS, (L) glut and piriformis. STR' s and TTP of BL UT's, LS, and pec major.     Strength: (* denotes performed with pain)  UE/Scapular LE   Shoulder Flex: R 4-/5, L 4-/5  Shoulder ABD (C5): R 4/5, L 4-/5  Biceps (C6): R 4/5, L 4/5  Triceps (C7): R 5/5, L 5/5  Thumb Ext (C8): R 5/5, L 5/5  Interossei (T1): R 5/5, L 5/5     Strength: WNL Hip flexion (L2): R 4/5; L 4/5  Hip abduction: R 4-/5; L 3+/5  Hip Extension: R 4-/5; L 3+/5   Hip ER: R 4/5; L 4-/5  Hip IR: R 4+/5; L 4-/5  Knee Flexion: R 4-/5; L 4-/5   Knee extension (L3): R 4/5; L 4/5   DF (L4): R 5/5; L 5/5  Great Toe Ext (L5): R 5/5, L  5/5  PF (S1): R 5/5; L 5/5     [facility]'s levels of core stability: 1A/5    Flexibility:   UE/Scapular LE   Upper Trap: R +; L +  Levator Scap: R +; L +  Pec Major: R +; L +  Scalenes: R +, L + Hip Flexor: R-, L -  Hamstrings: R -; L -  Piriformis: R -; L +  Quads: R -; L -  Gastroc-soleus: R -; L -     Special tests:   Slump: -  SLR: -  Farid: -  Claudia: +  ULTT: + on (L) for median and Ulnar neural restriction    Gait: pt ambulates on level ground with normal mechanics.      Today’s Treatment and Response:   Pt education was provided on exam findings, treatment diagnosis, treatment plan, expectations, and prognosis. Pt was also provided recommendations for activity modifications, possible soreness after evaluation, modalities as needed [ice/heat], postural corrections, and sleep positioning.  Patient was instructed in and issued a HEP for:     Access Code: [email or web address]  URL: [email or web address]  Date: 01/09/2024  Prepared by: Adelaide Moura    Exercises + pt eduction (25 min)  - Supine Posterior Pelvic Tilt  - 1 x daily - 7 x weekly - 3 sets - 10 reps  - Bent Knee Fallouts  - 1 x daily - 7 x weekly - 3 sets - 10 reps  - Supine March with Posterior Pelvic Tilt  - 1 x daily - 7 x weekly - 3 sets - 10 reps  - Supine Sciatic Nerve Glide  - 1 x daily - 7 x weekly - 3 sets - 10 reps    Significant time spent education pt on recruiting the TrA correctly during PPT's. Performance improved at end of tx.     Charges: PT Eval Low Complexity, TherEx: 2 unit      Total Timed Treatment: 25 min     Total Treatment Time: 60 min       PLAN OF CARE:    Goals: (to be met in 8 visits)   Pt will improve transversus abdominis recruitment to perform proper isometric contraction without requiring verbal or tactile cuing to promote advancement of therex   Pt will demonstrate good understanding of proper posture and body mechanics to decrease pain and improve spinal safety   Pt will report improved symptom centralization and absence of  radicular symptoms for 3 consecutive days to improve function with ADL   Pt will reports ease with sleeping with no radiating symptoms to the (L) UE and LE's  Pt will demonstrate improved core strength to be able to perform lifting from floor >30 lbs with <2/10 pain   Pt will improve mid/low trap strength to 4/5 MMT to improve posture   Pt will improve pec major/minor and scalene flexibility to resolve tingling to the (L) UE in supine positions  Pt will improve Deep cervical flexor strength to be able to perform a head lift for >25 seconds to improve ability to sitting >1 hour with good posture  Pt will be independent and compliant with comprehensive HEP to maintain progress achieved in PT      Frequency / Duration: Patient will be seen for 1-2 x/week or a total of 8 visits over a 90 day period. Treatment will include: Manual Therapy, Neuromuscular Re-education, Therapeutic Activities, Therapeutic Exercise, Home Exercise Program instruction, and Modalities to include: trigger point dry needling prn    Education or treatment limitation: None  Rehab Potential:good      Oswestry Disability Index Score  Score: 10 % (1/2/2024  9:05 AM)      Patient/Family/Caregiver was advised of these findings, precautions, and treatment options and has agreed to actively participate in planning and for this course of care.    Thank you for your referral. Please co-sign or sign and return this letter via fax as soon as possible to [phone]. If you have any questions, please contact me at Dept: [phone]    Sincerely,  Electronically signed by therapist: Adelaide Moura, PT    Physician's certification required: Yes  I certify the need for these services furnished under this plan of treatment and while under my care.    X___________________________________________________ Date____________________    Certification From: 1/9/2024  To:4/8/2024

## 2024-01-15 ENCOUNTER — APPOINTMENT (OUTPATIENT)
Dept: PHYSICAL THERAPY | Age: 39
End: 2024-01-15
Attending: ORTHOPAEDIC SURGERY
Payer: COMMERCIAL

## 2024-01-17 ENCOUNTER — APPOINTMENT (OUTPATIENT)
Dept: PHYSICAL THERAPY | Age: 39
End: 2024-01-17
Attending: ORTHOPAEDIC SURGERY
Payer: COMMERCIAL

## 2024-01-22 ENCOUNTER — APPOINTMENT (OUTPATIENT)
Dept: PHYSICAL THERAPY | Age: 39
End: 2024-01-22
Attending: ORTHOPAEDIC SURGERY
Payer: COMMERCIAL

## 2024-01-24 ENCOUNTER — TELEPHONE (OUTPATIENT)
Dept: PHYSICAL THERAPY | Facility: HOSPITAL | Age: 39
End: 2024-01-24

## 2024-01-24 ENCOUNTER — OFFICE VISIT (OUTPATIENT)
Dept: FAMILY MEDICINE CLINIC | Facility: CLINIC | Age: 39
End: 2024-01-24
Payer: OTHER MISCELLANEOUS

## 2024-01-24 ENCOUNTER — APPOINTMENT (OUTPATIENT)
Dept: PHYSICAL THERAPY | Age: 39
End: 2024-01-24
Attending: ORTHOPAEDIC SURGERY
Payer: COMMERCIAL

## 2024-01-24 ENCOUNTER — HOSPITAL ENCOUNTER (OUTPATIENT)
Dept: GENERAL RADIOLOGY | Age: 39
Discharge: HOME | End: 2024-01-24
Payer: COMMERCIAL

## 2024-01-24 ENCOUNTER — OFFICE VISIT (OUTPATIENT)
Dept: PHYSICAL THERAPY | Age: 39
End: 2024-01-24
Payer: COMMERCIAL

## 2024-01-24 VITALS
TEMPERATURE: 98 F | OXYGEN SATURATION: 98 % | HEIGHT: 66 IN | DIASTOLIC BLOOD PRESSURE: 62 MMHG | HEART RATE: 81 BPM | BODY MASS INDEX: 23.63 KG/M2 | RESPIRATION RATE: 16 BRPM | SYSTOLIC BLOOD PRESSURE: 118 MMHG | WEIGHT: 147 LBS
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
## 2024-01-24 DIAGNOSIS — M25.511 ACUTE PAIN OF RIGHT SHOULDER: ICD-10-CM

## 2024-01-24 DIAGNOSIS — R07.9 CHEST PAIN, UNSPECIFIED TYPE: ICD-10-CM

## 2024-01-24 DIAGNOSIS — M54.2 NECK PAIN: Primary | ICD-10-CM

## 2024-01-24 DIAGNOSIS — R07.9 CHEST PAIN, UNSPECIFIED TYPE: Primary | ICD-10-CM

## 2024-01-24 DIAGNOSIS — M54.2 NECK PAIN: ICD-10-CM

## 2024-01-24 LAB
ATRIAL RATE: 69 BPM
P AXIS: 7 DEGREES
P-R INTERVAL: 146 MS
Q-T INTERVAL: 378 MS
QRS DURATION: 78 MS
QTC CALCULATION (BEZET): 405 MS
R AXIS: 22 DEGREES
T AXIS: 60 DEGREES
VENTRICULAR RATE: 69 BPM

## 2024-01-24 PROCEDURE — 99214 OFFICE O/P EST MOD 30 MIN: CPT

## 2024-01-24 PROCEDURE — 3074F SYST BP LT 130 MM HG: CPT

## 2024-01-24 PROCEDURE — 97161 PT EVAL LOW COMPLEX 20 MIN: CPT | Performed by: PHYSICAL THERAPIST

## 2024-01-24 PROCEDURE — 97110 THERAPEUTIC EXERCISES: CPT | Performed by: PHYSICAL THERAPIST

## 2024-01-24 PROCEDURE — 71120 X-RAY EXAM BREASTBONE 2/>VWS: CPT

## 2024-01-24 PROCEDURE — 97140 MANUAL THERAPY 1/> REGIONS: CPT | Performed by: PHYSICAL THERAPIST

## 2024-01-24 PROCEDURE — 93000 ELECTROCARDIOGRAM COMPLETE: CPT

## 2024-01-24 PROCEDURE — 71111 X-RAY EXAM RIBS/CHEST4/> VWS: CPT

## 2024-01-24 PROCEDURE — 3078F DIAST BP <80 MM HG: CPT

## 2024-01-24 PROCEDURE — 3008F BODY MASS INDEX DOCD: CPT

## 2024-01-24 NOTE — PATIENT INSTRUCTIONS
Alternate ice-heat compresses  Continue Ibuprofen and Tylenol as needed for pain   Schedule physical therapy
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
## 2024-01-29 ENCOUNTER — OFFICE VISIT (OUTPATIENT)
Dept: PHYSICAL THERAPY | Age: 39
End: 2024-01-29
Payer: COMMERCIAL

## 2024-01-29 PROCEDURE — 97110 THERAPEUTIC EXERCISES: CPT | Performed by: PHYSICAL THERAPIST

## 2024-01-29 PROCEDURE — 97140 MANUAL THERAPY 1/> REGIONS: CPT | Performed by: PHYSICAL THERAPIST

## 2024-01-29 NOTE — PROGRESS NOTES
Diagnosis:   Neck pain (M54.2)  Acute pain of right shoulder (M25.511)       Referring Provider: Librado  Date of Evaluation:    1/24/24    Precautions:  None Next MD visit:   none scheduled  Date of Surgery: n/a   Insurance Primary/Secondary: TPL     # Auth Visits: N/A            Subjective: Pt reports she felt some (L) low back pain when going down to sit. Some UT soreness. The pain in front oh her chest is almost gone.     Pain: 3/10      Objective:     Cervical AROM: (* denotes performed with pain)  Flexion: WNL*   Extension: WNL*  Sidebending: R 75%*; L 75%*  Rotation: R 90%*; L 90%*       Assessment: Pt presenting with improvements in cervical rotation and gradually improving STR's of Bl upper traps. She is progressed with core stability and scapular strengthening to improve posture and return to lifting heavier weighted items during ADL's. She requires significant cuing during standing PPT but improved if placed against the wall for cuing.       Goals:   (to be met in 8 visits)    Pt will improve cervical AROM rotation to >75 degrees to improve tolerance for turning head to check blind spot while driving  Pt will have improved thoracic PA mobility to WNL to improve cervical ROM as well as promote upright posturing and decreased pain with prolonged sitting and driving   Pt will report decreased frequency of headaches to <2x/week  Pt will demonstrate improved cervical intrinsic strength to 5/5 to allow improved cervical stabilization with overhead reaching   Pt will improve postural strength (mid/low trap) to 4/5 to promote improved upright posturing and decreased pain with working out  Pt will demonstrate improved core stability to Sahrmann's level 3/5 to promote return to safe lifting and carrying >30 lbs  during ADL's  Pt will be independent and compliant with comprehensive HEP to maintain progress achieved in PT      Plan: Progress cervical stability, core stability and parascapular strength, hip strength,  work on PPT in standing and squatted positions   Date: 1/29/2024  TX#: 2/8 Date:                 TX#: 3/ Date:                 TX#: 4/ Date:                 TX#: 5/ Date:   Tx#: 6/   Manual tech: 15 min  STM to cervcal paraspinals, UT's, LS's and sub occipitals   -thoracic p/a's level IV-V       TherEx: 30 min  -Supine 90/90 taps: 2x10  -PPT w/ SLR: 2x10  -90/90 hold w/ 7# overhead: 2x30 sec  -dead bugs: YSB, x10 each side  -reverse fly: BTB, 2x 15  -Alternating diagonals: RTB, 2x10   -Pallof press: GTB, 2x10 each side  -hip hinge: x10 reps w. Stick   -wall sit with PPT: 10 sec x5      Hold  -YSB roll up 3D: x20 each  -reverse crunch:   -lateral walk  Hip abductions                       HEP:   Access Code: [email or web address]  URL: [email or web address]  Prepared by: Adelaide Moura       - Supine 90/90 Alternating Heel Touches with Posterior Pelvic Tilt  - 1 x daily - 7 x weekly - 3 sets - 10 reps  - Supine Pelvic Tilt with Straight Leg Raise  - 1 x daily - 7 x weekly - 3 sets - 10 reps  - Seated Gentle Upper Trapezius Stretch  - 1 x daily - 7 x weekly - 3 sets - 10 reps  - Seated Levator Scapulae Stretch  - 1 x daily - 7 x weekly - 3 sets - 10 reps  - Sidelying Thoracic Rotation with Open Book  - 1 x daily - 7 x weekly - 3 sets - 10 reps  - Doorway Pec Stretch at 60 Elevation  - 1 x daily - 7 x weekly - 3 sets - 10 reps            Charges: TherEx: 2 units, manual tech: 1 unit         Total Timed Treatment: 45 min  Total Treatment Time: 45 min

## 2024-02-05 ENCOUNTER — APPOINTMENT (OUTPATIENT)
Dept: PHYSICAL THERAPY | Age: 39
End: 2024-02-05
Attending: ORTHOPAEDIC SURGERY
Payer: COMMERCIAL

## 2024-02-07 ENCOUNTER — OFFICE VISIT (OUTPATIENT)
Dept: PHYSICAL THERAPY | Age: 39
End: 2024-02-07
Payer: COMMERCIAL

## 2024-02-07 PROCEDURE — 97110 THERAPEUTIC EXERCISES: CPT | Performed by: PHYSICAL THERAPIST

## 2024-02-07 PROCEDURE — 97140 MANUAL THERAPY 1/> REGIONS: CPT | Performed by: PHYSICAL THERAPIST

## 2024-02-07 NOTE — PROGRESS NOTES
Diagnosis:   Neck pain (M54.2)  Acute pain of right shoulder (M25.511)       Referring Provider: Librado  Date of Evaluation:    1/24/24    Precautions:  None Next MD visit:   none scheduled  Date of Surgery: n/a   Insurance Primary/Secondary: TPL     # Auth Visits: N/A            Subjective: Pt reports (L) sided low back pain when sitting.     Pain: 3/10      Objective:     Cervical AROM: (* denotes performed with pain)  Flexion: WNL   Extension: WNL  Sidebending: R 85%; L 85%  Rotation: R 90%; L 90%    Observation/palpation: (L) anterior pelvic tilt with (L) PSIS tenderness       Assessment: Pt presenting with mild TTP and trigger points along the UT R>L. Performed MET to (L) posterior pelvic tilt with 75% correction. She reported reduced sitting pain after. 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       Goals:   (to be met in 8 visits)    Pt will improve cervical AROM rotation to >75 degrees to improve tolerance for turning head to check blind spot while driving  Pt will have improved thoracic PA mobility to WNL to improve cervical ROM as well as promote upright posturing and decreased pain with prolonged sitting and driving   Pt will report decreased frequency of headaches to <2x/week  Pt will demonstrate improved cervical intrinsic strength to 5/5 to allow improved cervical stabilization with overhead reaching   Pt will improve postural strength (mid/low trap) to 4/5 to promote improved upright posturing and decreased pain with working out  Pt will demonstrate improved core stability to Sahrmann's level 3/5 to promote return to safe lifting and carrying >30 lbs  during ADL's  Pt will be independent and compliant with comprehensive HEP to maintain progress achieved in PT      Plan: Progress cervical stability, core stability and parascapular strength, hip strength, work on PPT in standing and squatted positions   Date: 1/29/2024  TX#: 2/8  HPI:    Patient ID: Nicolle Mars is a 13year old female. Patient presents with father for eczema all over the body. Started at birth. Was given triamcinolone in the past which worked better than the hydrocortisone they have tried. Currently using Tacrolimus 0.03% daily with no improvement. Parents are also applying cerave with no relief. Itching throughout the day and does scratch at night. She is able to sleep at night. No drianing or tenderness noted. Family hx of eczema noted. Allergy testing in the past showed allergies to cat dander. No allergies to medications noted. Review of Systems   Constitutional:  Negative for chills and fever. Skin:  Positive for rash. Current Outpatient Medications   Medication Sig Dispense Refill    triamcinolone 0.1 % External Ointment Apply 1 Application topically 2 (two) times daily. 453.6 g 1    tacrolimus (PROTOPIC) 0.1 % External Ointment Apply 1 Application topically 2 (two) times daily. 100 g 3    mupirocin 2 % External Ointment Apply 1 Application topically 3 (three) times daily. 30 g 0     Allergies:No Known Allergies   There were no vitals taken for this visit. There is no height or weight on file to calculate BMI. PHYSICAL EXAM:   Physical Exam  Constitutional:       General: She is active. Skin:     General: Skin is warm and dry. Findings: Rash present. Comments: Eczematous areas noted throughout the body. Worse on the antecubtial fossae, buttocks, upper thighs and upper back. No drianing or tenderness noted. No scaling noted. Erythematous dry areas noted throughout. Neurological:      Mental Status: She is alert and oriented for age. ASSESSMENT/PLAN:   1.  Intrinsic eczema  -After discussion with patient's father, advised the following:  -Ordered allergy testing  -Will call with results   -Start triamcinolone  -Educated to apply 2 times per day for the next 2 weeks, then daily for 2 weeks, then 3 times per week for 1 Date: 2/7/24                TX#: 3/8 Date:                 TX#: 4/ Date:                 TX#: 5/ Date:   Tx#: 6/   Manual tech: 15 min  STM to cervcal paraspinals, UT's, LS's and sub occipitals   -thoracic p/a's level IV-V Manual tech: 15 min  (R) anterior pelvic correction and assessment w/ MET  -STM to BL UT and cervical       TherEx: 30 min  -Supine 90/90 taps: 2x10  -PPT w/ SLR: 2x10  -90/90 hold w/ 7# overhead: 2x30 sec  -dead bugs: YSB, x10 each side  -reverse fly: BTB, 2x 15  -Alternating diagonals: RTB, 2x10   -Pallof press: GTB, 2x10 each side  -hip hinge: x10 reps w. Stick   -wall sit with PPT: 10 sec x5      Hold  -YSB roll up 3D: x20 each  -reverse crunch:   -lateral walk  Hip abductions   TherEx: 30 min  -UBE: 5 min  -Supine 90/90 taps: 7# DB 2x10  -PPT w/ SLR: 7#, 2x10  -90/90 hold w/ 7# overhead: 2x30 sec  -dead bugs: YSB, 2x10 each side  -Reverse crunch: Red phys ball squeeze: 2x10  -YSB 3D roll ups: x10 each  -lateral walk: GTB, 2x50 feet  -monster walk: GTB, 2x50 feet  -reverse fly: BTB, 2x 15  -Alternating diagonals: RTB, 2x10   -Pallof press: GTB, 2x10 each side  -hip hinge: x10 reps w. Stick   -wall sit with PPT: 10 sec x5      Hold  -side plank w/ clams  Hip abductions  -lateral walkouts                    HEP:   Access Code: [email or web address]  URL: [email or web address]  Prepared by: Adelaide Moura       - Supine 90/90 Alternating Heel Touches with Posterior Pelvic Tilt  - 1 x daily - 7 x weekly - 3 sets - 10 reps  - Supine Pelvic Tilt with Straight Leg Raise  - 1 x daily - 7 x weekly - 3 sets - 10 reps  - Seated Gentle Upper Trapezius Stretch  - 1 x daily - 7 x weekly - 3 sets - 10 reps  - Seated Levator Scapulae Stretch  - 1 x daily - 7 x weekly - 3 sets - 10 reps  - Sidelying Thoracic Rotation with Open Book  - 1 x daily - 7 x weekly - 3 sets - 10 reps  - Doorway Pec Stretch at 60 Elevation  - 1 x daily - 7 x weekly - 3 sets - 10 reps            Charges: TherEx: 2 units, manual tech: 1 unit     month  -Start mupirocin  -Educated to apply 2-3 times per day for 1 week on open areas PRN  -Start tacrolimus  -Educated to apply 2 times per day on more affected areas.   -Start xyzal nightly   -Return in 1 month.   -To call or follow-up with worsening symptoms or concerns.   -Patient's father was agreeable to plan and will comply with discussion above. - Adult Food Allergy Prof; Future  - Allergen [facility]. Reg. Prof; Future      Orders Placed This Encounter      Adult Food Allergy Prof      Allergen [facility]. Reg. Prof      Meds This Visit:  Requested Prescriptions     Signed Prescriptions Disp Refills    triamcinolone 0.1 % External Ointment 453.6 g 1     Sig: Apply 1 Application topically 2 (two) times daily. tacrolimus (PROTOPIC) 0.1 % External Ointment 100 g 3     Sig: Apply 1 Application topically 2 (two) times daily. mupirocin 2 % External Ointment 30 g 0     Sig: Apply 1 Application topically 3 (three) times daily.        Imaging & Referrals:  None         TB#[id number]      Total Timed Treatment: 45 min  Total Treatment Time: 45 min

## 2024-02-12 ENCOUNTER — OFFICE VISIT (OUTPATIENT)
Dept: PHYSICAL THERAPY | Age: 39
End: 2024-02-12
Attending: ORTHOPAEDIC SURGERY
Payer: COMMERCIAL

## 2024-02-12 PROCEDURE — 97110 THERAPEUTIC EXERCISES: CPT | Performed by: PHYSICAL THERAPIST

## 2024-02-12 NOTE — PROGRESS NOTES
Diagnosis:   Neck pain (M54.2)  Acute pain of right shoulder (M25.511)       Referring Provider: Louie  Date of Evaluation:    1/24/24    Precautions:  None Next MD visit:   none scheduled  Date of Surgery: n/a   Insurance Primary/Secondary: TPL     # Auth Visits: N/A            Subjective: Pt reports she had a massage today and feels good. The low back pain did not return after last time.     Pain: 0/10      Objective:     Cervical AROM: (* denotes performed with pain)  Flexion: WNL   Extension: WNL  Sidebending: R 100%; L 100%  Rotation: R 100%; L 100%    Observation/palpation: symmetrical pelvis       Assessment: Pt presenting with normalized cervical ROM and symmetrical pelvis. Continued to progress core stability and UE strength for return to normal lifting task at work and house chores. She tolerates tx well.    Goals:   (to be met in 8 visits)    Pt will improve cervical AROM rotation to >75 degrees to improve tolerance for turning head to check blind spot while driving  Pt will have improved thoracic PA mobility to WNL to improve cervical ROM as well as promote upright posturing and decreased pain with prolonged sitting and driving   Pt will report decreased frequency of headaches to <2x/week  Pt will demonstrate improved cervical intrinsic strength to 5/5 to allow improved cervical stabilization with overhead reaching   Pt will improve postural strength (mid/low trap) to 4/5 to promote improved upright posturing and decreased pain with working out  Pt will demonstrate improved core stability to Sahrmann's level 3/5 to promote return to safe lifting and carrying >30 lbs  during ADL's  Pt will be independent and compliant with comprehensive HEP to maintain progress achieved in PT      Plan: discharge to HEP next tx  Date: 1/29/2024  TX#: 2/8 Date: 2/7/24                TX#: 3/8 Date:2/12/24                 TX#: 4/8 Date:                 TX#: 5/ Date:   Tx#: 6/   Manual tech: 15 min  STM to cervcal  paraspinals, UT's, LS's and sub occipitals   -thoracic p/a's level IV-V Manual tech: 15 min  (R) anterior pelvic correction and assessment w/ MET  -STM to BL UT and cervical       TherEx: 30 min  -Supine 90/90 taps: 2x10  -PPT w/ SLR: 2x10  -90/90 hold w/ 7# overhead: 2x30 sec  -dead bugs: YSB, x10 each side  -reverse fly: BTB, 2x 15  -Alternating diagonals: RTB, 2x10   -Pallof press: GTB, 2x10 each side  -hip hinge: x10 reps w. Stick   -wall sit with PPT: 10 sec x5      Hold  -YSB roll up 3D: x20 each  -reverse crunch:   -lateral walk  Hip abductions   TherEx: 30 min  -UBE: 5 min  -Supine 90/90 taps: 7# DB 2x10  -PPT w/ SLR: 7#, 2x10  -90/90 hold w/ 7# overhead: 2x30 sec  -dead bugs: YSB, 2x10 each side  -Reverse crunch: Red phys ball squeeze: 2x10  -YSB 3D roll ups: x10 each  -lateral walk: GTB, 2x50 feet  -monster walk: GTB, 2x50 feet  -reverse fly: BTB, 2x 15  -Alternating diagonals: RTB, 2x10   -Pallof press: GTB, 2x10 each side  -hip hinge: x10 reps w. Stick   -wall sit with PPT: 10 sec x5      Hold  -side plank w/ clams  Hip abductions  -lateral walkouts TherEx: 45 min  -UBE: 5 min  -Supine 90/90 taps: 7# DB 2x10  -PPT w/ SLR: 7#, 2x10  -dead bugs: YSB, 2x10 each side  -LTR's with leg kick outs: 2x to fatigue  -quadruped leg/arm extensions: 2x10 each  -side plank w/ clams: 2x10 each  -lateral walk: GTB, 2x50 feet  -monster walk: GTB, 2x50 feet  -standing hip abductions: 2x10 each leg in mini squat  -angels: BTB, 2x10  -Alternating diagonals: RTB, 2x10   -ER at side BL: 3x10, BTB                         HEP:   Access Code: [email or web address]  URL: [email or web address]  Prepared by: Adelaide Moura       - Supine 90/90 Alternating Heel Touches with Posterior Pelvic Tilt  - 1 x daily - 7 x weekly - 3 sets - 10 reps  - Supine Pelvic Tilt with Straight Leg Raise  - 1 x daily - 7 x weekly - 3 sets - 10 reps  - Seated Gentle Upper Trapezius Stretch  - 1 x daily - 7 x weekly - 3 sets - 10 reps  - Seated Levator Scapulae  Stretch  - 1 x daily - 7 x weekly - 3 sets - 10 reps  - Sidelying Thoracic Rotation with Open Book  - 1 x daily - 7 x weekly - 3 sets - 10 reps  - Doorway Pec Stretch at 60 Elevation  - 1 x daily - 7 x weekly - 3 sets - 10 reps            Charges: TherEx: 3 units       Total Timed Treatment: 45 min  Total Treatment Time: 45 min

## 2024-02-20 NOTE — PROGRESS NOTES
Discharge Summary  Pt has attended 5 visits in Physical Therapy.    Diagnosis:   Neck pain (M54.2)  Acute pain of right shoulder (M25.511)       Referring Provider: Librado  Date of Evaluation:    1/24/24    Precautions:  None Next MD visit:   none scheduled  Date of Surgery: n/a   Insurance Primary/Secondary: TPL     # Auth Visits: N/A            Subjective: Pt reports she is not having any problems with neck but at times feels some stiffness so she does some stretching and it helps. Overall, she has returned to her normal activities and work duties. She has occasional headaches but are mild and does not need to take medicine for them.       Pain: 0/10      Objective:     Cervical AROM: (* denotes performed with pain)  Flexion: WNL   Extension: WNL  Sidebending: R 100%; L 100%  Rotation: R 100%; L 100%       Accessory motion: Normal cervical p/a joint mobility and sternocostal p/a's      Palpation: Tenderness at (L) 2nd sternocostal joints, BL upper trap, sub occipitals, and cervical paraspinals.       Strength: (* denotes performed with pain)  UE/Scapular   Shoulder Flex: R 4-/5, L 4-/5  Shoulder ABD (C5): R 4/5, L 4/5  Biceps (C6): R 4-/5, L 4-/5  Wrist ext (C6): R 5/5, L 5/5  Triceps (C7): R 5/5, L 5/5  Wrist Flex (C7): R 5/5, L 5/5  Digit Flex (C8): R 5/5, L 5/5  Thumb Ext (C8): R 5/5, L 5/5  Interossei (T1): R 5/5, L 5/5  Shoulder: IR/ER: R 4-/5,L 4-/5         Strength: R WNL ; L WNL       Sahrmann's Levels of Core stability: Level 2/5        Flexibility:   UE/Scapular   Upper Trap: R +; L +  Levator Scap: R +; L +  Pec Major: R WNL; L WNL         Special tests:   Alar Ligament Testing: R -, L -  Sharp Chantell: -  Cervical Compression: -  Cervical Distraction: -     Gait: pt ambulates on level ground with normal mechanics.       Assessment: Pt has responded well to PT and reports being back at [facility]. She has returned to all house and work duties w/o limitations. She occasionally feels neck stiffness but  resolves with stretches. She overall demonstrates normalized spinal AROM, improved strength, improved core stability and body mechanics. She is discharged to a HEP.     Goals:   (to be met in 8 visits)    Pt will improve cervical AROM rotation to >75 degrees to improve tolerance for turning head to check blind spot while driving-MET  Pt will have improved thoracic PA mobility to WNL to improve cervical ROM as well as promote upright posturing and decreased pain with prolonged sitting and driving -MET  Pt will report decreased frequency of headaches to <2x/week-MET  Pt will demonstrate improved cervical intrinsic strength to 5/5 to allow improved cervical stabilization with overhead reaching -MET  Pt will improve postural strength (mid/low trap) to 4/5 to promote improved upright posturing and decreased pain with working out-MET functional goal  Pt will demonstrate improved core stability to Sahrmann's level 3/5 to promote return to safe lifting and carrying >30 lbs  during ADL's-MET functional goal  Pt will be independent and compliant with comprehensive HEP to maintain progress achieved in PT  -MET      Oswestry Disability Index Score  Score: 10 % (1/2/2024  9:05 AM)    Post Oswestry Disability Index Score  Post Score: 2 % (2/21/2024  9:56 AM)    8 % improvement        Plan: Discharged to a [facility]    Thank you for your referral. If you have any questions, please contact me at Dept: [phone].    Sincerely,  Electronically signed by therapist: Adelaide Moura, PT     Physician's certification required:  No  Please co-sign or sign and return this letter via fax as soon as possible to [phone].   I certify the need for these services furnished under this plan of treatment and while under my care.    X___________________________________________________ Date____________________    Certification From: 2/20/2024  To:5/20/2024    Date: 1/29/2024  TX#: 2/8 Date: 2/7/24                TX#: 3/8 Date:2/12/24                  TX#: 4/8 Date: 2/21/24                TX#: 5/8 Date:   Tx#: 6/   Manual tech: 15 min  STM to cervcal paraspinals, UT's, LS's and sub occipitals   -thoracic p/a's level IV-V Manual tech: 15 min  (R) anterior pelvic correction and assessment w/ MET  -STM to BL UT and cervical   Manual tech: 15 min  (R) anterior pelvic correction and assessment w/ MET  -STM to BL UT and cervical     TherEx: 30 min  -Supine 90/90 taps: 2x10  -PPT w/ SLR: 2x10  -90/90 hold w/ 7# overhead: 2x30 sec  -dead bugs: YSB, x10 each side  -reverse fly: BTB, 2x 15  -Alternating diagonals: RTB, 2x10   -Pallof press: GTB, 2x10 each side  -hip hinge: x10 reps w. Stick   -wall sit with PPT: 10 sec x5      Hold  -YSB roll up 3D: x20 each  -reverse crunch:   -lateral walk  Hip abductions   TherEx: 30 min  -UBE: 5 min  -Supine 90/90 taps: 7# DB 2x10  -PPT w/ SLR: 7#, 2x10  -90/90 hold w/ 7# overhead: 2x30 sec  -dead bugs: YSB, 2x10 each side  -Reverse crunch: Red phys ball squeeze: 2x10  -YSB 3D roll ups: x10 each  -lateral walk: GTB, 2x50 feet  -monster walk: GTB, 2x50 feet  -reverse fly: BTB, 2x 15  -Alternating diagonals: RTB, 2x10   -Pallof press: GTB, 2x10 each side  -hip hinge: x10 reps w. Stick   -wall sit with PPT: 10 sec x5      Hold  -side plank w/ clams  Hip abductions  -lateral walkouts TherEx: 45 min  -UBE: 5 min  -Supine 90/90 taps: 7# DB 2x10  -PPT w/ SLR: 7#, 2x10  -dead bugs: YSB, 2x10 each side  -LTR's with leg kick outs: 2x to fatigue  -quadruped leg/arm extensions: 2x10 each  -side plank w/ clams: 2x10 each  -lateral walk: GTB, 2x50 feet  -monster walk: GTB, 2x50 feet  -standing hip abductions: 2x10 each leg in mini squat  -angels: BTB, 2x10  -Alternating diagonals: RTB, 2x10   -ER at side BL: 3x10, BTB       TherEx: 30 min  -UBE: 5 min  -reassessment  -Supine 90/90 leg extensions: x10 each  -dead bugs: YSB, 2x10 each side  -SL bridges: 2x10  -bridge w/ marches: 2x10  -quadruped leg/arm extensions: 2x10 each  -side plank w/ clams:  2x10 each, RTB  -lateral walk: GTB, 2x50 feet  -standing hip abductions: 2x10 each leg in mini squat  -Alternating diagonals: RTB, 2x10   -Low trap slide and lift off: RTB, 2x10                  HEP:   Access Code: [email or web address]  URL: [email or web address]  Date: 02/21/2024  Prepared by: Adelaide Moura    Exercises  - Seated Gentle Upper Trapezius Stretch  - 1 x daily - 7 x weekly - 3 sets - 10 reps  - Seated Levator Scapulae Stretch  - 1 x daily - 7 x weekly - 3 sets - 10 reps  - Doorway Pec Stretch at 60 Elevation  - 1 x daily - 7 x weekly - 3 sets - 10 reps  - Dead Bug with Swiss Ball  - 1 x daily - 7 x weekly - 3 sets - 10 reps  - Supine 90/90 with Leg Extensions  - 1 x daily - 7 x weekly - 3 sets - 10 reps  - Single Leg Bridge with Leg Supported  - 1 x daily - 7 x weekly - 3 sets - 10 reps  - Marching Bridge  - 1 x daily - 7 x weekly - 3 sets - 10 reps  - Side Plank with Clam  - 1 x daily - 7 x weekly - 3 sets - 10 reps  - Bird Dog  - 1 x daily - 7 x weekly - 3 sets - 10 reps  - Forward T  - 1 x daily - 7 x weekly - 3 sets - 10 reps  - Side Stepping with Resistance at Ankles  - 1 x daily - 7 x weekly - 3 sets - 10 reps  - Hip Abduction with Resistance Loop  - 1 x daily - 7 x weekly - 3 sets - 10 reps  - Reverse Fly with Anchored Resistance  - 1 x daily - 7 x weekly - 3 sets - 10 reps  - Shoulder Flexion with Anterior Anchored Resistance  - 1 x daily - 7 x weekly - 3 sets - 10 reps  - Standing Shoulder Diagonal Horizontal Abduction 60/120 Degrees with Resistance  - 1 x daily - 7 x weekly - 3 sets - 10 reps  - Standing Low Trap Setting with Resistance at Wall  - 1 x daily - 7 x weekly - 3 sets - 10 reps       Charges: TherEx: 2 units, manual tech: 1 unit       Total Timed Treatment: 45 min  Total Treatment Time: 45 min

## 2024-02-21 ENCOUNTER — OFFICE VISIT (OUTPATIENT)
Dept: PHYSICAL THERAPY | Age: 39
End: 2024-02-21
Payer: COMMERCIAL

## 2024-02-21 PROCEDURE — 97140 MANUAL THERAPY 1/> REGIONS: CPT | Performed by: PHYSICAL THERAPIST

## 2024-02-21 PROCEDURE — 97110 THERAPEUTIC EXERCISES: CPT | Performed by: PHYSICAL THERAPIST

## 2024-02-28 ENCOUNTER — APPOINTMENT (OUTPATIENT)
Dept: PHYSICAL THERAPY | Age: 39
End: 2024-02-28
Payer: COMMERCIAL

## 2024-11-21 ENCOUNTER — OFFICE VISIT (OUTPATIENT)
Dept: OBGYN CLINIC | Facility: CLINIC | Age: 39
End: 2024-11-21
Payer: COMMERCIAL

## 2024-11-21 VITALS
DIASTOLIC BLOOD PRESSURE: 82 MMHG | HEIGHT: 66 IN | BODY MASS INDEX: 24.48 KG/M2 | WEIGHT: 152.31 LBS | SYSTOLIC BLOOD PRESSURE: 125 MMHG

## 2024-11-21 DIAGNOSIS — Z01.419 ENCOUNTER FOR WELL WOMAN EXAM WITH ROUTINE GYNECOLOGICAL EXAM: Primary | ICD-10-CM

## 2024-11-21 DIAGNOSIS — Z12.4 ENCOUNTER FOR PAPANICOLAOU SMEAR FOR CERVICAL CANCER SCREENING: ICD-10-CM

## 2024-11-21 PROCEDURE — 3079F DIAST BP 80-89 MM HG: CPT | Performed by: OBSTETRICS & GYNECOLOGY

## 2024-11-21 PROCEDURE — 3074F SYST BP LT 130 MM HG: CPT | Performed by: OBSTETRICS & GYNECOLOGY

## 2024-11-21 PROCEDURE — 3008F BODY MASS INDEX DOCD: CPT | Performed by: OBSTETRICS & GYNECOLOGY

## 2024-11-21 PROCEDURE — 99385 PREV VISIT NEW AGE 18-39: CPT | Performed by: OBSTETRICS & GYNECOLOGY

## 2024-11-21 NOTE — PROGRESS NOTES
[facility]  Obstetrics and Gynecology  Gynecology Visit    Chief Complaint   Patient presents with    Annual           Leydi LAL Kelechi is a 39 year old female who presents for annual .    LMP: 24 .    Menses regular: normal .    Menstrual flow normal: moderate .    Birth control or HRT:  none .   Refill none   Last Pap Smear: unknown .  Any history of abnormal paps: none    Last MMG: n/a   Any Medication Refills needed today?: none   Sleep: 10 hours .    Diet: normal .    Exercise: walking every day .   Screening labs/Blood work today: annual blood work .     Colonoscopy (if over 44 y/o): n/a .   Gardasil:(age 9-44 y/o) n/a .   Genetic Cancer screen (if indicated): none .   Flu (Aug-April): n/a .TDAP (every 10 years) up to date .      Additional Problems/concerns: no other concerns   .      Next Appt: would like to wait to schedule.     Immunization History   Administered Date(s) Administered    TDAP 02/15/2012         Current Outpatient Medications:     azelastine 0.1 % Nasal Solution, 2 sprays by Nasal route 2 (two) times daily., Disp: 30 mL, Rfl: 5    Allergies[1]    OB History    Para Term  AB Living   4 3 3 0 1 3   SAB IAB Ectopic Multiple Live Births   1 0 0 0 3      # Outcome Date GA Lbr Isreal/2nd Weight Sex Type Anes PTL Lv   4 Term 13 38w6d 02:49 / 00:07 7 lb 3.7 oz (3.28 kg) F NORMAL SPONT None N AWA      Name: KELECHIGIRL       Apgar1: 9  Apgar5: 9   3 Term 12 40w0d   M Vag-Spont   AWA   2 SAB            1 Term 0217 40w0d   M Vag-Spont   AWA       Past Medical History:    Decorative tattoo    Pap smear for cervical cancer screening    negative       Past Surgical History:   Procedure Laterality Date    Hip surgery         Family History   Problem Relation Age of Onset    Alcohol and Other Disorders Associated Father     Cancer Father         Liver and Bone dx age 50s    Diabetes Mother     No Known Problems Daughter     No Known Problems Son     No Known  Problems Maternal Grandmother     No Known Problems Maternal Grandfather     No Known Problems Paternal Grandmother     No Known Problems Paternal Grandfather     No Known Problems Son         Tobacco  Allergies  Meds  Soc Hx        Social History     Socioeconomic History    Marital status:      Spouse name: Not on file    Number of children: Not on file    Years of education: Not on file    Highest education level: Not on file   Occupational History    Not on file   Tobacco Use    Smoking status: Never     Passive exposure: Never    Smokeless tobacco: Never    Tobacco comments:     Socially   Vaping Use    Vaping status: Never Used   Substance and Sexual Activity    Alcohol use: Yes     Alcohol/week: 2.0 standard drinks of alcohol     Types: 1 Glasses of wine, 1 Cans of beer per week     Comment: occ    Drug use: No    Sexual activity: Yes     Birth control/protection: Condom   Other Topics Concern     Service Not Asked    Blood Transfusions Not Asked    Caffeine Concern Yes     Comment: 1 x/week    Occupational Exposure Not Asked    Hobby Hazards Not Asked    Sleep Concern Not Asked    Stress Concern Not Asked    Weight Concern Not Asked    Special Diet Not Asked    Back Care Not Asked    Exercise Yes     Comment: 20-30 min/daily    Bike Helmet Not Asked    Seat Belt Yes    Self-Exams Not Asked   Social History Narrative    Not on file     Social Drivers of Health     Financial Resource Strain: Not on file   Food Insecurity: Not on file   Transportation Needs: Not on file   Physical Activity: Not on file   Stress: Not on file   Social Connections: Not on file   Housing Stability: Not on file     /82   Ht 5' 6\" (1.676 m)   Wt 152 lb 5.4 oz (69.1 kg)   LMP 11/14/2024   BMI 24.59 kg/m²     Wt Readings from Last 3 Encounters:   11/21/24 152 lb 5.4 oz (69.1 kg)   01/24/24 147 lb (66.7 kg)   12/04/23 147 lb (66.7 kg)         Health Maintenance   Topic Date Due    Influenza Vaccine (1)  08/01/2021    Screen for Cervical Cancer 11/05/2021    DTaP,Tdap and Td Vaccines (3 - Td or Tdap) 03/18/2025    Hepatitis C Screening Completed    HIV Screening Completed    COVID-19 Vaccine Completed     Review of Systems   General: Present- Feeling well. Not Present- Chills, Fever, Weight Gain and Weight Loss.  HEENT: Not Present- Headache and Sore Throat.  Respiratory: Not Present- Cough, Difficulty Breathing, Hemoptysis and Sputum Production.  Cardiovascular: Not Present- Chest Pain, Elevated Blood Pressure, Fainting / Blacking Out and Shortness of Breath.  Gastrointestinal: Not Present- Constipation, Diarrhea, Nausea and Vomiting.  Female Genitourinary: Not Present- Discharge, Dysuria and Frequency.  Musculoskeletal: Not Present- Leg Cramps and Swelling of Extremities.  Neurological: Not Present- Dizziness and Headaches.  Psychiatric: Not Present- Anxiety and Depression.  Endocrine: Not Present- Appetite Changes, Hair Changes and Thyroid Problems.  Hematology: Not Present- Easy Bruising and Excessive bleeding.  All other systems negative     Physical Exam The physical exam findings are as follows:     General   Mental Status - Alert. General Appearance - Cooperative. Orientation - Oriented X4. Build & Nutrition - Well nourished.    Head and Neck  Thyroid   Gland Characteristics - normal size and consistency.    Chest and Lung Exam   Inspection:   Chest Wall: - Normal.  Percussion:   Quality and Intensity: - Percussion normal.  Palpation: - Palpation normal.  Auscultation:   Breath sounds: - Normal.  Adventitious sounds: - No Adventitious sounds.    Breast   Nipples: Characteristics - Bilateral - Normal. Discharge - Bilateral - None.  Breast - Bilateral - Symmetric.    Cardiovascular   Auscultation: Rhythm - Regular. Heart Sounds - Normal heart sounds.  Murmurs & Other Heart Sounds: Auscultation of the heart reveals - No Murmurs.    Abdomen   Inspection: Inspection of the abdomen reveals - No Hernias. Incisional  scars - no incisional scars.  Palpation/Percussion: Palpation and Percussion of the abdomen reveal - Non Tender and No Palpable abdominal masses.  Liver: - Normal.  Auscultation: Auscultation of the abdomen reveals - Bowel sounds normal.    Female Genitourinary     External Genitalia   Perineum - Normal. Bartholin's Gland - Bilateral - Normal. Clitoris - Normal.  Introitus: Characteristics - No Cystocele, Enterocele or Rectocele. Discharge - None.  Labia Majora: Lesions - Bilateral - None. Characteristics - Bilateral - Normal.  Labia Minora: Lesions - Bilateral - None. Characteristics - Bilateral - Normal.  Urethra: Characteristics - Normal. Discharge - None.  Playa Fortuna Gland - Bilateral - Normal.  Vulva: Characteristics - Normal. Lesions - None.    Speculum & Bimanual   Vagina:   Vaginal Wall: - Normal.  Vaginal Lesions - None. Vaginal Mucosa - Normal.  Cervix: Characteristics - No Motion tenderness. Discharge - None.  Uterus: Characteristics - Normal. Position - Midposition.  Adnexa: Characteristics - Bilateral - Normal. Masses - No Adnexal Masses.  Wet Mount: pH - 3.8-4.2. Vaginal discharge - Clear  and Thin. Amine Odor - Absent. Main patient complaints - Discharge.     Rectal   Anorectal Exam: External - normal external exam.    Peripheral Vascular   Upper Extremity:   Palpation: - Pulses bilaterally normal.  Lower Extremity: Inspection - Bilateral - Inspection Normal.  Palpation: Edema - Bilateral - No edema.    Neurologic   Mental Status: - Normal.    Lymphatic  General Lymphatics   Description - Normal .       1. Encounter for well woman exam with routine gynecological exam    2. Encounter for Papanicolaou smear for cervical cancer screening                               [1] No Known Allergies

## 2024-12-02 LAB
.: NORMAL
.: NORMAL

## 2024-12-03 LAB — HPV E6+E7 MRNA CVX QL NAA+PROBE: NEGATIVE

## 2025-07-12 ENCOUNTER — HOSPITAL ENCOUNTER (OUTPATIENT)
Age: 40
Discharge: HOME | End: 2025-07-12
Payer: COMMERCIAL

## 2025-07-12 VITALS
DIASTOLIC BLOOD PRESSURE: 80 MMHG | RESPIRATION RATE: 20 BRPM | HEART RATE: 88 BPM | OXYGEN SATURATION: 100 % | HEIGHT: 65 IN | TEMPERATURE: 99 F | SYSTOLIC BLOOD PRESSURE: 127 MMHG | WEIGHT: 157 LBS | BODY MASS INDEX: 26.16 KG/M2

## 2025-07-12 DIAGNOSIS — N76.0 ACUTE VAGINITIS: Primary | ICD-10-CM

## 2025-07-12 LAB
B-HCG UR QL: NEGATIVE
BILIRUB UR QL STRIP: NEGATIVE
COLOR UR: YELLOW
GLUCOSE UR STRIP-MCNC: NEGATIVE MG/DL
HGB UR QL STRIP: NEGATIVE
KETONES UR STRIP-MCNC: NEGATIVE MG/DL
NITRITE UR QL STRIP: NEGATIVE
PH UR STRIP: 5.5 [PH]
PROT UR STRIP-MCNC: NEGATIVE MG/DL
SP GR UR STRIP: <=1.005
UROBILINOGEN UR STRIP-ACNC: <2 MG/DL

## 2025-07-12 PROCEDURE — 87491 CHLMYD TRACH DNA AMP PROBE: CPT | Performed by: NURSE PRACTITIONER

## 2025-07-12 PROCEDURE — 99204 OFFICE O/P NEW MOD 45 MIN: CPT

## 2025-07-12 PROCEDURE — 81514 NFCT DS BV&VAGINITIS DNA ALG: CPT | Performed by: NURSE PRACTITIONER

## 2025-07-12 PROCEDURE — 87086 URINE CULTURE/COLONY COUNT: CPT | Performed by: NURSE PRACTITIONER

## 2025-07-12 PROCEDURE — 81025 URINE PREGNANCY TEST: CPT

## 2025-07-12 PROCEDURE — 87591 N.GONORRHOEAE DNA AMP PROB: CPT | Performed by: NURSE PRACTITIONER

## 2025-07-12 PROCEDURE — 81002 URINALYSIS NONAUTO W/O SCOPE: CPT

## 2025-07-12 PROCEDURE — 99214 OFFICE O/P EST MOD 30 MIN: CPT

## 2025-07-12 RX ORDER — FLUCONAZOLE 150 MG/1
TABLET ORAL
Qty: 2 TABLET | Refills: 0 | Status: SHIPPED | OUTPATIENT
Start: 2025-07-12

## 2025-07-12 NOTE — ED PROVIDER NOTES
Patient Seen in: [facility]        History  Chief Complaint   Patient presents with    Vaginal Problem     Stated Complaint: Itching,burning    Subjective:   HPI          Is a pleasant 39-year-old female here for evaluation of vaginal itching and discharge.  Symptoms started about 3 days ago.  Patient states she is sexually active with 1 male partner.  Denies known exposure to STI.  Denies contact with irritant or change in products.  Has tried over-the-counter Monistat with mild relief.      Objective:     No pertinent past medical history.            Past Surgical History:   Procedure Laterality Date    Hip surgery  1992                No pertinent social history.            Review of Systems    Positive for stated complaint: Itching,burning  Other systems are as noted in HPI.  Constitutional and vital signs reviewed.      All other systems reviewed and negative except as noted above.                  Physical Exam    ED Triage Vitals [07/12/25 1250]   /80   Pulse 88   Resp 20   Temp 98.5 °F (36.9 °C)   Temp src Oral   SpO2 100 %   O2 Device None (Room air)       Current Vitals:   Vital Signs  BP: 127/80  Pulse: 88  Resp: 20  Temp: 98.5 °F (36.9 °C)  Temp src: Oral    Oxygen Therapy  SpO2: 100 %  O2 Device: None (Room air)            Physical Exam  Vitals and nursing note reviewed.   Constitutional:       General: She is not in acute distress.     Appearance: Normal appearance. She is not ill-appearing, toxic-appearing or diaphoretic.   HENT:      Mouth/Throat:      Mouth: Mucous membranes are moist.   Eyes:      Conjunctiva/sclera: Conjunctivae normal.      Pupils: Pupils are equal, round, and reactive to light.   Cardiovascular:      Rate and Rhythm: Normal rate.   Pulmonary:      Effort: Pulmonary effort is normal.   Abdominal:      Palpations: Abdomen is soft.   Genitourinary:     Vagina: Vaginal discharge present.      Comments: Bilateral labial excoriation  Thin white discharge within  vaginal canal  Lymphadenopathy:      Lower Body: No right inguinal adenopathy. No left inguinal adenopathy.   Neurological:      Mental Status: She is alert.         ED Course  Labs Reviewed   [facility] POCT URINALYSIS DIPSTICK - Abnormal; Notable for the following components:       Result Value    Urine Clarity Turbid (*)     Leukocyte esterase urine Moderate (*)     All other components within normal limits   POCT PREGNANCY URINE - Normal   URINE CULTURE, ROUTINE   CHLAMYDIA/GONOCOCCUS, PATTI   VAGINITIS VAGINOSIS PCR PANEL                  MDM           Medical Decision Making  Differentials include but are not limited to bacterial vaginosis, candidal infection, STI and UTI.  Urinalysis does reveal moderate leukocytes, denies urgency or frequency.  Suspect contamination of leukocytes with specimen collection.  Will hold empiric antibiotics at this time, urine culture is pending.  Vaginitis panel and GC/CT pending.  Will start Diflucan.  Patient agrees with plan of care.  All questions answered to patient satisfaction    Amount and/or Complexity of Data Reviewed  Labs: ordered. Decision-making details documented in ED Course.        Disposition and Plan     Clinical Impression:  1. Acute vaginitis         Disposition:  Discharge  7/12/2025  1:25 pm    Follow-up:  Amita Singh MD  [address] [place] [id number]  [phone]      As needed          Medications Prescribed:  Discharge Medication List as of 7/12/2025  1:28 PM        START taking these medications    Details   fluconazole (DIFLUCAN) 150 MG Oral Tab Take 1 tablet by mouth today, may repeat in 3 days if needed, Normal, Disp-2 tablet, R-0                   Supplementary Documentation:

## 2025-07-13 LAB
BV BACTERIA DNA VAG QL NAA+PROBE: NEGATIVE
C GLABRATA DNA VAG QL NAA+PROBE: NEGATIVE
C KRUSEI DNA VAG QL NAA+PROBE: NEGATIVE
CANDIDA DNA VAG QL NAA+PROBE: POSITIVE
T VAGINALIS DNA VAG QL NAA+PROBE: NEGATIVE

## 2025-07-14 LAB
C TRACH DNA SPEC QL NAA+PROBE: NEGATIVE
N GONORRHOEA DNA SPEC QL NAA+PROBE: NEGATIVE

## (undated) NOTE — MR AVS SNAPSHOT
After Visit Summary   5/17/2021    Sae Martinez    MRN: [id number]           Visit Information     Date & Time  5/17/2021  3:30 PM Provider  [facility]  [address]  Dept.  Phone  [phone]      Your V from [employer], please take a few minutes to complete it and provide feedback. We strive to deliver the best patient experience and are looking for ways to make improvements. Your feedback will help us do so.  For more information on [employer], please v immediate intervention at a hospital emergency room.  Average cost  $2,300*   *Cost varies based on your insurance coverage  For more information about hours, locations or appointment options available at [facility],   visit [email or web address] or